# Patient Record
Sex: FEMALE | Race: WHITE | NOT HISPANIC OR LATINO | Employment: UNEMPLOYED | URBAN - METROPOLITAN AREA
[De-identification: names, ages, dates, MRNs, and addresses within clinical notes are randomized per-mention and may not be internally consistent; named-entity substitution may affect disease eponyms.]

---

## 2017-02-20 ENCOUNTER — ALLSCRIPTS OFFICE VISIT (OUTPATIENT)
Dept: OTHER | Facility: OTHER | Age: 42
End: 2017-02-20

## 2017-05-12 ENCOUNTER — ALLSCRIPTS OFFICE VISIT (OUTPATIENT)
Dept: OTHER | Facility: OTHER | Age: 42
End: 2017-05-12

## 2017-06-14 ENCOUNTER — ALLSCRIPTS OFFICE VISIT (OUTPATIENT)
Dept: OTHER | Facility: OTHER | Age: 42
End: 2017-06-14

## 2017-08-16 ENCOUNTER — ALLSCRIPTS OFFICE VISIT (OUTPATIENT)
Dept: OTHER | Facility: OTHER | Age: 42
End: 2017-08-16

## 2017-09-25 ENCOUNTER — GENERIC CONVERSION - ENCOUNTER (OUTPATIENT)
Dept: OTHER | Facility: OTHER | Age: 42
End: 2017-09-25

## 2017-10-19 ENCOUNTER — GENERIC CONVERSION - ENCOUNTER (OUTPATIENT)
Dept: OTHER | Facility: OTHER | Age: 42
End: 2017-10-19

## 2018-01-09 NOTE — RESULT NOTES
Verified Results  * XR CHEST PA & LATERAL [id number] 11:05AM Hollace Derick     Test Name Result Flag Reference   XR CHEST PA & LATERAL (Report)     CHEST      INDICATION: Shortness of breath for 3 weeks  COMPARISON: None     VIEWS: Frontal and lateral projections; 2 images     FINDINGS:        Cardiomediastinal silhouette appears unremarkable  The lungs are clear  No pneumothorax or pleural effusion  Visualized osseous structures appear within normal limits for the patient's age  IMPRESSION:     No active pulmonary disease  Workstation performed: [id number]     Signed by:   Fredy Patel MD   5/27/16     VAS LOWER LIMB VENOUS DUPLEX STUDY, UNILATERAL/LIMITED [id number] 12:00AM Hollace Derick     Test Name Result Flag Reference   VAS LOWER LIMB VENOUS DUPLEX STUDY, UNILATERAL/LIMITED (Report)     [facility] REPORT   CLINICAL:   Indications: Left Localized edema [R60 0]  x 1 day   No pain   Dyspnea x 2 weeks   upper back pain   The patient has history of smoking (current) 1 ppd  She has no history of CAD,   DVT or malignancy  The patient current BMI is 42 14, Weight is 188 lb and Height   is 56 in  FINDINGS:      Segment Right      Left          Impression    Impression       CFV   Normal (Patent) Normal (Patent)             CONCLUSION:   Impression:   RIGHT LOWER LIMB LIMITED: NORMAL   Evaluation shows no evidence of thrombus in the common femoral vein  Doppler evaluation shows a normal response to augmentation maneuvers  LEFT LOWER LIMB: NORMAL   No evidence of acute or chronic deep vein thrombosis   No evidence of superficial thrombophlebitis noted  Doppler evaluation shows a normal response to distal augmentation maneuvers     Popliteal, posterior tibial and anterior tibial arterial Doppler waveforms are   triphasic      SIGNATURE:   Electronically Signed by: Harmony Chappell MD, RPVI on 2016-05-28 12:41:45 PM

## 2018-01-10 NOTE — PROGRESS NOTES
Chief Complaint  Spirometry testing completed patient gave good effort and cooperation  with some coughing and wheezing noted during test please see report in EMR      Active Problems    1  Acute bacterial pharyngitis (462) (J02 8,B96 89)   2  Acute bronchitis (466 0) (J20 9)   3  BMI 28 0-28 9,adult (V85 24) (Z68 28)   4  Breast abscess (611 0) (N61 1)   5  Chronic obstructive pulmonary disease (496) (J44 9)   6  Cluster headache (339 00) (G44 009)   7  Depression (311) (F32 9)   8  Encounter for smoking cessation counseling (V65 42,305 1) (Z71 6,Z72 0)   9  Nipple Discharge (611 79)   10  Ovarian cyst (620 2) (N83 20)   11  Wears glasses (V49 89) (Z97 3)    Current Meds   1  Albuterol Sulfate (2 5 MG/3ML) 0 083% Inhalation Nebulization Solution; USE 1 UNIT   DOSE IN NEBULIZER EVERY 4 HOURS AS NEEDED; Therapy: 80Hgw3751 to (Last Rx:95Ktq7975)  Requested for: 46Orh6071 Ordered   2  Azithromycin 250 MG Oral Tablet; TAKE 2 TABLETS ON DAY 1 THEN TAKE 1 TABLET A   DAY FOR 4 DAYS; Therapy: 74KQE7576 to (Last Rx:28Bnt9863)  Requested for: 04CLO3257 Ordered   3  Flovent  MCG/ACT Inhalation Aerosol; INHALE 1 PUFF TWICE DAILY  clean   mouth with water; swish and spit after each use; Therapy: 72Fjy3123 to (Last Rx:99Rmy4644)  Requested for: 03Kyk8294 Ordered   4  GuaiFENesin  MG TB12; TAKE 1 TABLET EVERY 12 HOURS AS NEEDED FOR   CONGESTION; Therapy: 77Qyz7188 to (Evaluate:67Bjx8399)  Requested for: 36Mjz2069; Last   Rx:16Dlp9418 Ordered   5  Nebulizer Device; use as directed; Therapy: 98Hif1771 to (Last Rx:90Zir6765)  Requested for: 45Lpd6963 Ordered   6  Nebulizer/Adult Mask KIT; use as directed; Therapy: 78Ofr8284 to (Last Rx:79Ful0867)  Requested for: 71Fex4110 Ordered   7  ProAir  (90 Base) MCG/ACT Inhalation Aerosol Solution; INHALE 1-2 PUFFS   EVERY 4-6 HOURS AS NEEDED AND AS DIRECTED; Therapy: 98WEP2642 to (Last Rx:27May2016) Ordered   8   TraZODone HCl - 50 MG Oral Tablet; TAKE 1 TABLET AT BEDTIME AS NEEDED FOR   SLEEP; Therapy: 96GHC5046 to (Evaluate:35Dwe6063)  Requested for: 72SYC6606; Last   Rx:56Pju5599 Ordered    Allergies    1  Bactrim DS TABS   2  Penicillins    3  No Known Environmental Allergies   4  No Known Food Allergies    Plan  Acute bacterial pharyngitis    · Azithromycin 250 MG Oral Tablet  Acute bronchitis    · GuaiFENesin  MG TB12    Future Appointments    Date/Time Provider Specialty Site   07/19/2017 05:30 PM JAGDEEP Macias   Family [facility]     Signatures   Electronically signed by : JAGDEEP Canales ; Arsh 15 2017 12:21PM EST                       (Author)    Electronically signed by : EDITH Winslow ; Jun 16 2017  3:03PM EST                       (Author)

## 2018-01-10 NOTE — PROGRESS NOTES
Assessment    1  Chronic obstructive pulmonary disease (496) (J44 9)   2  Cluster headache (339 00) (G44 009)   3  Depression (311) (F32 9)   4  Encounter for smoking cessation counseling (V65 42,305 1) (Z71 6,Z72 0)   5  BMI 28 0-28 9,adult (V85 24) (Z68 28)   6  Acute bacterial pharyngitis (462) (J02 8,B96 89)   7  History of Tonsillectomy   8  Family history of Bipolar disorder : Daughter    Plan   Acute bacterial pharyngitis    · Azithromycin 250 MG Oral Tablet; TAKE 2 TABLETS ON DAY 1 THEN TAKE 1  TABLET A DAY FOR 4 DAYS  BMI 28 0-28 9,adult    · Begin or continue regular aerobic exercise  Gradually work up to at least {count1}  sessions of {dur1} of exercise a week ; Status:Complete;   Done: 94XQP2374   · Eat a low fat and low cholesterol diet ; Status:Complete;   Done: 84JIE1404   · Some eating tips that can help you lose weight ; Status:Complete;   Done: 31RRH7602  Depression    · TraZODone HCl - 50 MG Oral Tablet; TAKE 1 TABLET AT BEDTIME AS NEEDED  FOR SLEEP    Complete PFT; Status:Hold For - Scheduling; Requested for:06Wsq8143;   Perform:[facility]; PJD:92SUO3112; Ordered;     For:Chronic obstructive pulmonary disease; Ordered By:Taj Da Silva;      Discussion/Summary    Patient was here complaining of chest pain for 2 weeks which is resolved, discussed with the patient that her chest pain is most likely related to her anxiety and depression, patient today scored 15 in PHQ9 , scored positive for mood disorder questionnaire, advised patient to follow-up with family guidance for further evaluation for possible bipolar, we'll start her on trazodone 50 mg daily, discussed with patient that she will not feel the effects of the medication directly to determine need about 4-6 weeks to feel the difference  For COPD: will send the patient for Pulmonary function test Did correct diagnosis, counseled patient about smoking cessation and offered to help, patient stated that she is ready for now but she will try later  # Elevated BMI: discussed with patient the need to lose weight with diet and exercise, educated patient about healthy diet, advised patient to exercise in regular basis and her target should be one hour 5 times a week  # Bacterial pharyngitis: Will start her on azithromycin 250 mg, take 2 tablets the first day then one tablet until finished  Will follow-up in one month  Possible side effects of new medications were reviewed with the patient/guardian today  The treatment plan was reviewed with the patient/guardian  The patient/guardian understands and agrees with the treatment plan     Self Referrals: Yes GYN      Chief Complaint  Chest pain for 2 weeks      History of Present Illness  HPI: 49-year-old female came to the clinic complaining of chest pain for about 2 weeks, her pain was located in the middle of her chest, achy and sharp, lasts about 5 minutes, does not radiate to any location, sometimes associated with shortness of breath, currently has no chest pain, she was not feeling good , she is anxious about her daughter who just moved to [place], her daughter has bipolar for which she takes medications  Patient states that she was told that she has situational depression, sometimes she feels angry, each month she has wanted one week for which she feels more hyper, and spends a lot of money for which she regrets, she has been like that for about 10 years, she always has problems staying asleep, there is no family history of bipolar part from her daughter, today she scored 13 in phq 9, An scored positive in mood disorder questionnaire  She feels that her throat is swollen, she was told that she had a viral infection about 3 weeks ago but still feeling not cured, she she was told that she has COPD but never had lung function tests, she has a 27 year history of smoking one pack a day, still smoking, Not willing to quit for now   She also has migraine headache, her headache is around her eyes, sometimes has tears with her headache, she has headache couple times a month then it will go away and come back in different month, she has no phonophobia or photophobia, no nausea or vomiting, does not know when she will get a headache  She has her own GYN doctor for which she follows for Pap smear and mammogram  She is gaining weight and she does not do diet or exercise  Review of Systems    Constitutional: feeling poorly and feeling tired, but no fever and no chills  Eyes: no eye pain, no eyesight problems, no dryness of the eyes, eyes not red and no itching of the eyes  ENT: sore throat, but no earache, no nosebleeds and no nasal discharge  Cardiovascular: the heart rate was not slow, no chest pain, the heart rate was not fast, no palpitations and no lower extremity edema  Respiratory: shortness of breath, but no cough, no orthopnea, no wheezing and no shortness of breath during exertion  Gastrointestinal: no abdominal pain, no nausea, no vomiting and no diarrhea  Genitourinary: no dysuria  Musculoskeletal: myalgias, but no arthralgias, no joint swelling and no joint stiffness  Integumentary: no rashes, no itching, no breast pain and no breast lump  Neurological: headache, but no numbness, no tingling, no dizziness and no difficulty walking  Psychiatric: anxiety, sleep disturbances, depression and emotional problems, but not suicidal and no personality change  Hematologic/Lymphatic: no swollen glands, no tendency for easy bleeding, no tendency for easy bruising and no swollen glands in the neck  Active Problems    1  Acute bronchitis (466 0) (J20 9)   2  Breast abscess (611 0) (N61 1)   3  Chronic obstructive pulmonary disease (496) (J44 9)   4  Nipple Discharge (611 79)   5  Ovarian cyst (620 2) (N83 20)   6   Wears glasses (V49 89) (Z97 3)    Past Medical History    · History of Abdominal pain, RUQ (789 01) (R10 11)   · History of Acute bronchiolitis (466 19) (J21 9)   · History of Acute upper respiratory infection (465 9) (J06 9)   · History of Back pain (724 5) (M54 9)   · History of Breast abscess (611 0) (N61 1)   · History of Chest pain (786 50) (R07 9)   · History of Cyst of breast, unspecified laterality (610 0) (N60 09)   · History of Dizziness of unknown cause (780 4) (R42)   · History of Dry skin (701 1) (L85 3)   · History of Ear ache (388 70) (H92 09)   · History of Edema leg (782 3) (R60 0)   · History of Elderly multigravida with antepartum condition or complication (844 56)  (J69 946)   · History of Fetal Bradycardia - Before Onset Of Labor (763 81)   · History of allergic drug reaction (V14 9) (Z88 9)   · History of amenorrhea (V13 29) (Z87 42)   · History of chronic obstructive lung disease (V12 69) (Z87 09)   · History of esophageal reflux (V12 79) (Z87 19)   · History of hyperthyroidism (V12 29) (Z86 39)   · History of migraine (V12 49) (Z86 69)   · History of shortness of breath (V13 89) (M28 023)   · History of Influenza vaccination declined (V64 06) (Z28 21)   · History of Joint pain (719 40) (M25 50)   · History of Migraine without aura, intractable (346 11) (G43 019)   · History of Muscle pain (729 1) (M79 1)   · History of Nonpuerperal abscess of breast (611 0) (N61 1)   · History of Normal pregnancy (V22 2) (Z34 90)   · History of Poor Prenatal Care (V23 7)   · History of Pregnant With History Of Grand Multiparity (V23 3)   · History of Rash (782 1) (R21)   · History of Subclinical hyperthyroidism (242 90) (E05 90)   · History of Swelling of ankle (719 07) (M25 473)   · History of Tobacco use disorder complicating pregnancy, childbirth, or the puerperium,  antepartum condition or complication (623 14) (C16 541)   · History of Uterine scar from previous  delivery, antepartum condition or  complication (403 89) (M49 47)    Surgical History    · History of Breast Surgery   · History of  Section   · History of Tonsillectomy    Family History  Mother · Family history of Chronic Obstructive Pulmonary Disease With Exacerbation   · Family history of Emphysema   · Family history of Pulmonary Disease   · Family history of Reported Family History Of Heart Disease  Father    · Family history of Aneurysm Of The Cerebellar Artery   · Family history of   Daughter    · Family history of Bipolar disorder  Sibling    · Family history of   Sister    · Family history of Thyroid condition   · Family history of Trisomy 24 (Down Syndrome)  Maternal Grandfather    · Family history of Diabetes Mellitus (V18 0)  Maternal Aunt    · Family history of Breast cancer    Social History    · Current Every Day Smoker (305 1)   · Current Smoker (305 1)   · Does not drink alcohol (V49 89) (Z78 9)   · Denied: History of Drug use   ·  (V61 03) (Z63 5)    Current Meds   1  Albuterol Sulfate (2 5 MG/3ML) 0 083% Inhalation Nebulization Solution; USE 1 UNIT   DOSE IN NEBULIZER EVERY 4 HOURS AS NEEDED; Therapy: 20Ucr4792 to (Last Rx:08Xmv2616)  Requested for: 97Gxz1282 Ordered   2  Flovent  MCG/ACT Inhalation Aerosol; INHALE 1 PUFF TWICE DAILY  clean   mouth with water; swish and spit after each use; Therapy: 88Spb9396 to (Last Rx:64Ysl8953)  Requested for: 60Eap6542 Ordered   3  GuaiFENesin  MG TB12; TAKE 1 TABLET EVERY 12 HOURS AS NEEDED FOR   CONGESTION; Therapy: 95Opc4696 to (Evaluate:16Moh1710)  Requested for: 33Ddd8740; Last   Rx:35Itf0908 Ordered   4  Nebulizer Device; use as directed; Therapy: 96Cxt7165 to (Last Rx:80Tqw1421)  Requested for: 46Ecq3161 Ordered   5  Nebulizer/Adult Mask KIT; use as directed; Therapy: 71Qyh3999 to (Last Rx:50Myb6993)  Requested for: 32Dwu4006 Ordered   6  ProAir  (90 Base) MCG/ACT Inhalation Aerosol Solution; INHALE 1-2 PUFFS   EVERY 4-6 HOURS AS NEEDED AND AS DIRECTED; Therapy: 23YQV7700 to (Last Rx:97Ibj9516) Ordered    Allergies    1  Bactrim DS TABS   2  Penicillins    3   No Known Environmental Allergies   4  No Known Food Allergies    Vitals   Recorded: 46CSN3578 11:09AM   Heart Rate 95   Respiration 18   Systolic 902   Diastolic 70   Height 5 ft 6 5 in   Weight 181 lb 2 oz   BMI Calculated 28 8   BSA Calculated 1 93   O2 Saturation 98     Physical Exam    Constitutional   General appearance: No acute distress, well appearing and well nourished  Eyes   Conjunctiva and lids: No swelling, erythema or discharge  Pupils and irises: Equal, round and reactive to light  Ears, Nose, Mouth, and Throat   External inspection of ears and nose: Normal     Otoscopic examination: Tympanic membranes translucent with normal light reflex  Canals patent without erythema  Oropharynx: Normal with no erythema, edema, exudate or lesions  Pulmonary   Respiratory effort: No increased work of breathing or signs of respiratory distress  Auscultation of lungs: Clear to auscultation  Cardiovascular   Palpation of heart: Normal PMI, no thrills  Auscultation of heart: Normal rate and rhythm, normal S1 and S2, without murmurs  Examination of extremities for edema and/or varicosities: Normal     Abdomen   Abdomen: Non-tender, no masses  Liver and spleen: No hepatomegaly or splenomegaly  Lymphatic   Palpation of lymph nodes in neck: No lymphadenopathy  Musculoskeletal   Gait and station: Normal     Digits and nails: Normal without clubbing or cyanosis  Inspection/palpation of joints, bones, and muscles: Normal     Skin   Skin and subcutaneous tissue: Normal without rashes or lesions  Neurologic   Cranial nerves: Cranial nerves 2-12 intact  Reflexes: 2+ and symmetric  Sensation: No sensory loss      Psychiatric   Orientation to person, place, and time: Normal     Mood and affect: Normal        Results/Data  PHQ-9 Adult Depression Screening [id number] 12:27PM Dunia Ferrara     Test Name Result Flag Reference   PHQ-9 Adult Depression Score 15     Over the last two weeks, how often have you been bothered by any of the following problems? Little interest or pleasure in doing things: Nearly every day - 3  Feeling down, depressed, or hopeless: More than half the days - 2  Trouble falling or staying asleep, or sleeping too much: Nearly every day - 3  Feeling tired or having little energy: Nearly every day - 3  Poor appetite or over eating: Nearly every day - 3  Feeling bad about yourself - or that you are a failure or have let yourself or your family down: Several days - 1  Trouble concentrating on things, such as reading the newspaper or watching television: Not at all - 0  Moving or speaking so slowly that other people could have noticed  Or the opposite -  being so fidgety or restless that you have been moving around a lot more than usual: Not at all - 0  Thoughts that you would be better off dead, or of hurting yourself in some way: Not at all - 0   PHQ-9 Adult Depression Screening Positive     PHQ-9 Difficulty Level Somewhat difficult     PHQ-9 Severity      Moderately Severe Depression       Attending Note  Attending Note: Attending Note: I interviewed and examined the patient, I supervised the Resident and I agree with the Resident management plan as it was presented to me  Level of Participation: I was present in clinic and examined the patient  Comments/Additional Findings: Trazodone given for sleep  I agree with the Resident's note except Pharynx exam: whitish exudates posterior pharynx  Rx azithromycin  Amoxicillin avoided as mononucleosis also a possibility  Signatures   Electronically signed by : JAGDEEP Hickman ; May 15 2017  4:39AM EST                       (Author)    Electronically signed by :  JAGDEEP Carreno ; May 15 2017  9:57AM EST                       (Co-author)

## 2018-01-13 VITALS
OXYGEN SATURATION: 96 % | WEIGHT: 181 LBS | HEART RATE: 106 BPM | DIASTOLIC BLOOD PRESSURE: 86 MMHG | SYSTOLIC BLOOD PRESSURE: 132 MMHG | TEMPERATURE: 96.7 F | BODY MASS INDEX: 28.41 KG/M2 | HEIGHT: 67 IN | RESPIRATION RATE: 16 BRPM

## 2018-01-14 VITALS
HEIGHT: 67 IN | SYSTOLIC BLOOD PRESSURE: 140 MMHG | OXYGEN SATURATION: 97 % | WEIGHT: 173 LBS | RESPIRATION RATE: 18 BRPM | BODY MASS INDEX: 27.15 KG/M2 | DIASTOLIC BLOOD PRESSURE: 90 MMHG | TEMPERATURE: 96.4 F | HEART RATE: 92 BPM

## 2018-01-14 VITALS
DIASTOLIC BLOOD PRESSURE: 70 MMHG | BODY MASS INDEX: 28.43 KG/M2 | HEART RATE: 95 BPM | WEIGHT: 181.13 LBS | HEIGHT: 67 IN | OXYGEN SATURATION: 98 % | RESPIRATION RATE: 18 BRPM | SYSTOLIC BLOOD PRESSURE: 130 MMHG

## 2018-01-22 VITALS
HEART RATE: 109 BPM | RESPIRATION RATE: 18 BRPM | HEIGHT: 67 IN | SYSTOLIC BLOOD PRESSURE: 132 MMHG | TEMPERATURE: 98.3 F | OXYGEN SATURATION: 96 % | WEIGHT: 166 LBS | BODY MASS INDEX: 26.06 KG/M2 | DIASTOLIC BLOOD PRESSURE: 78 MMHG

## 2018-06-01 ENCOUNTER — OFFICE VISIT (OUTPATIENT)
Dept: FAMILY MEDICINE CLINIC | Facility: CLINIC | Age: 43
End: 2018-06-01
Payer: COMMERCIAL

## 2018-06-01 VITALS
OXYGEN SATURATION: 98 % | DIASTOLIC BLOOD PRESSURE: 82 MMHG | BODY MASS INDEX: 25.11 KG/M2 | WEIGHT: 160 LBS | RESPIRATION RATE: 16 BRPM | HEIGHT: 67 IN | SYSTOLIC BLOOD PRESSURE: 148 MMHG | HEART RATE: 102 BPM

## 2018-06-01 DIAGNOSIS — R07.89 LEFT-SIDED CHEST WALL PAIN: Primary | ICD-10-CM

## 2018-06-01 DIAGNOSIS — J44.9 CHRONIC OBSTRUCTIVE PULMONARY DISEASE, UNSPECIFIED COPD TYPE (HCC): ICD-10-CM

## 2018-06-01 DIAGNOSIS — M62.838 MUSCLE SPASM: ICD-10-CM

## 2018-06-01 PROCEDURE — 99213 OFFICE O/P EST LOW 20 MIN: CPT | Performed by: FAMILY MEDICINE

## 2018-06-01 RX ORDER — TRAZODONE HYDROCHLORIDE 100 MG/1
1 TABLET ORAL
COMMUNITY
End: 2018-09-25

## 2018-06-01 RX ORDER — METRONIDAZOLE 500 MG/1
500 TABLET ORAL EVERY 8 HOURS
Refills: 0 | COMMUNITY
Start: 2018-05-21 | End: 2018-06-12

## 2018-06-01 RX ORDER — ALBUTEROL SULFATE 90 UG/1
2 AEROSOL, METERED RESPIRATORY (INHALATION) EVERY 6 HOURS PRN
COMMUNITY
End: 2018-09-25 | Stop reason: SDUPTHER

## 2018-06-01 RX ORDER — CIPROFLOXACIN 500 MG/1
500 TABLET, FILM COATED ORAL EVERY 12 HOURS
Refills: 0 | COMMUNITY
Start: 2018-05-21 | End: 2018-06-12

## 2018-06-01 RX ORDER — VENLAFAXINE HYDROCHLORIDE 150 MG/1
CAPSULE, EXTENDED RELEASE ORAL
Refills: 0 | COMMUNITY
Start: 2018-05-13 | End: 2018-09-25

## 2018-06-01 RX ORDER — CYCLOBENZAPRINE HCL 10 MG
10 TABLET ORAL 2 TIMES DAILY PRN
Qty: 20 TABLET | Refills: 0 | Status: SHIPPED | OUTPATIENT
Start: 2018-06-01 | End: 2018-06-12

## 2018-06-01 RX ORDER — VENLAFAXINE HYDROCHLORIDE 75 MG/1
CAPSULE, EXTENDED RELEASE ORAL
Refills: 0 | COMMUNITY
Start: 2018-05-01 | End: 2018-09-25

## 2018-06-01 RX ORDER — ALBUTEROL SULFATE 2.5 MG/3ML
1 SOLUTION RESPIRATORY (INHALATION) EVERY 4 HOURS PRN
COMMUNITY
Start: 2017-02-20 | End: 2018-09-25 | Stop reason: SDUPTHER

## 2018-06-01 NOTE — PROGRESS NOTES
Assessment/Plan:       Diagnoses and all orders for this visit:    Left-sided chest wall pain  -     CT chest w contrast; Future  -     Basic metabolic panel; Future  -     cyclobenzaprine (FLEXERIL) 10 mg tablet; Take 1 tablet (10 mg total) by mouth 2 (two) times a day as needed for muscle spasms  -     Basic metabolic panel  -     If CT chest is Normal then plan to do physical therapy  Chronic obstructive pulmonary disease, unspecified COPD type ([facility] [address] )  -     CT chest w contrast; Future    Muscle spasm  -     cyclobenzaprine (FLEXERIL) 10 mg tablet; Take 1 tablet (10 mg total) by mouth 2 (two) times a day as needed for muscle spasms      Subjective:      Patient ID: Todd Purdy is a 43 y o  female  Patient is here with the complain of sharp pain in left midaxillary line  As per patient she had a fistula removed from her left breast on February, 2018 and she started to have left midaxillary line pain after the surgery  Patient thought it will go away that is why she did not seek for help  As per patient the pain is mostly when she moves her left arm,  No pain at rest  Patient is also a current everyday smoker, has COPD  The following portions of the patient's history were reviewed and updated as appropriate: allergies, current medications, past family history, past medical history, past social history, past surgical history and problem list     Review of Systems   Constitutional: Negative for chills and fever  HENT: Negative for congestion  Respiratory: Positive for cough  Negative for shortness of breath  Cardiovascular: Negative for chest pain and leg swelling     Musculoskeletal:         Left-sided  Midaxillary line chest wall pain         Objective:      /82 (BP Location: Left arm, Patient Position: Sitting)   Pulse 102   Resp 16   Ht 5' 7" (1 702 m)   Wt 72 6 kg (160 lb)   SpO2 98%   BMI 25 06 kg/m²          Physical Exam   Constitutional: She appears well-developed and well-nourished  HENT:   Head: Normocephalic and atraumatic  Eyes: Conjunctivae are normal  Pupils are equal, round, and reactive to light  Neck: Normal range of motion  Cardiovascular: Normal rate, regular rhythm and normal heart sounds  Pulmonary/Chest: Effort normal  No respiratory distress  Musculoskeletal:    No tenderness on palpation in  Left midaxillary line  Full range of motion of left shoulder joint  Lymphadenopathy:     She has no cervical adenopathy

## 2018-06-06 LAB
BUN SERPL-MCNC: 5 MG/DL (ref 6–24)
BUN/CREAT SERPL: 10 (ref 9–23)
CALCIUM SERPL-MCNC: 8.9 MG/DL (ref 8.7–10.2)
CHLORIDE SERPL-SCNC: 96 MMOL/L (ref 96–106)
CO2 SERPL-SCNC: 22 MMOL/L (ref 18–29)
CREAT SERPL-MCNC: 0.5 MG/DL (ref 0.57–1)
GLUCOSE SERPL-MCNC: 81 MG/DL (ref 65–99)
POTASSIUM SERPL-SCNC: 4 MMOL/L (ref 3.5–5.2)
SL AMB EGFR AFRICAN AMERICAN: 138 ML/MIN/1.73
SL AMB EGFR NON AFRICAN AMERICAN: 120 ML/MIN/1.73
SODIUM SERPL-SCNC: 135 MMOL/L (ref 134–144)

## 2018-06-08 ENCOUNTER — TELEPHONE (OUTPATIENT)
Dept: FAMILY MEDICINE CLINIC | Facility: CLINIC | Age: 43
End: 2018-06-08

## 2018-06-08 ENCOUNTER — HOSPITAL ENCOUNTER (OUTPATIENT)
Dept: RADIOLOGY | Facility: HOSPITAL | Age: 43
Discharge: HOME | End: 2018-06-08
Payer: COMMERCIAL

## 2018-06-08 DIAGNOSIS — R07.89 LEFT-SIDED CHEST WALL PAIN: ICD-10-CM

## 2018-06-08 DIAGNOSIS — J44.9 CHRONIC OBSTRUCTIVE PULMONARY DISEASE, UNSPECIFIED COPD TYPE (HCC): ICD-10-CM

## 2018-06-08 PROCEDURE — 71260 CT THORAX DX C+: CPT

## 2018-06-08 RX ADMIN — IOHEXOL 85 ML: 350 INJECTION, SOLUTION INTRAVENOUS at 13:59

## 2018-06-08 NOTE — TELEPHONE ENCOUNTER
Pt Called in and stated that she was in pain and would like a call back as soon as possible  PT did have CS done today and waiting for results

## 2018-06-11 ENCOUNTER — TELEPHONE (OUTPATIENT)
Dept: FAMILY MEDICINE CLINIC | Facility: CLINIC | Age: 43
End: 2018-06-11

## 2018-06-11 DIAGNOSIS — S22.32XA CLOSED FRACTURE OF ONE RIB OF LEFT SIDE, INITIAL ENCOUNTER: Primary | ICD-10-CM

## 2018-06-11 PROBLEM — S22.32XK CLOSED FRACTURE OF ONE RIB OF LEFT SIDE WITH NONUNION: Status: ACTIVE | Noted: 2018-06-11

## 2018-06-11 RX ORDER — METHOCARBAMOL 500 MG/1
500 TABLET, FILM COATED ORAL 3 TIMES DAILY
Qty: 30 TABLET | Refills: 0 | Status: SHIPPED | OUTPATIENT
Start: 2018-06-11 | End: 2018-09-25

## 2018-06-11 RX ORDER — NAPROXEN 500 MG/1
500 TABLET ORAL
Qty: 30 TABLET | Refills: 0 | Status: SHIPPED | OUTPATIENT
Start: 2018-06-11 | End: 2018-06-12 | Stop reason: SDUPTHER

## 2018-06-11 NOTE — TELEPHONE ENCOUNTER
Pt went to get medication, and it is [facility], she wanted to know if she could have something else

## 2018-06-11 NOTE — TELEPHONE ENCOUNTER
I discussed CT chest report with patient which showed  a fracture of the 6th posterior left rib which is displaced anteriorly approximately 2 mm  Recommended to stop taking Flexeril  I also gave referral to be seen by orthopedic and prescribed naproxen with food and Robaxin for her rib pain  Discussed with Dr Marya Vallejo

## 2018-06-12 ENCOUNTER — OFFICE VISIT (OUTPATIENT)
Dept: OBGYN CLINIC | Facility: CLINIC | Age: 43
End: 2018-06-12
Payer: COMMERCIAL

## 2018-06-12 ENCOUNTER — TELEPHONE (OUTPATIENT)
Dept: FAMILY MEDICINE CLINIC | Facility: CLINIC | Age: 43
End: 2018-06-12

## 2018-06-12 VITALS
HEART RATE: 90 BPM | SYSTOLIC BLOOD PRESSURE: 171 MMHG | DIASTOLIC BLOOD PRESSURE: 108 MMHG | WEIGHT: 164 LBS | HEIGHT: 67 IN | BODY MASS INDEX: 25.74 KG/M2

## 2018-06-12 DIAGNOSIS — S22.32XA CLOSED FRACTURE OF ONE RIB OF LEFT SIDE, INITIAL ENCOUNTER: ICD-10-CM

## 2018-06-12 PROCEDURE — 99243 OFF/OP CNSLTJ NEW/EST LOW 30: CPT | Performed by: ORTHOPAEDIC SURGERY

## 2018-06-12 RX ORDER — NAPROXEN 500 MG/1
500 TABLET ORAL 2 TIMES DAILY WITH MEALS
Qty: 60 TABLET | Refills: 0 | Status: SHIPPED | OUTPATIENT
Start: 2018-06-12 | End: 2018-09-25

## 2018-06-12 RX ORDER — CYCLOBENZAPRINE HCL 5 MG
10 TABLET ORAL 3 TIMES DAILY PRN
Qty: 30 TABLET | Refills: 0 | Status: CANCELLED | OUTPATIENT
Start: 2018-06-12

## 2018-06-12 NOTE — PROGRESS NOTES
Assessment/Plan:  1  Closed fracture of one rib of left side, initial encounter  Ambulatory referral to Orthopedic Surgery       Scribe Attestation    I,:   Malachi Blackwell am acting as a scribe while in the presence of the attending physician :        I,:   Noel Preciado, DO personally performed the services described in this documentation    as scribed in my presence :            Neftaly Purdy has a minimally displaced rib fracture visible on C T  The fracture does appear to be acute/subacute  It is unclear exactly when this happened  If this occurred back in February and should have decreased in pain at this point  It does appear to have caused increased discomfort last 2 weeks  If this is an acute fracture, this should likely heal in 4-6 weeks and be significantly reduced and pain in that time  She should continue with pain control with naproxen and possibly topical anesthetic if needed  I advised against narcotic pain medication due to its high potential for addiction  She will hopefully be feeling better in the next few weeks  She may follow up with me as needed  Subjective:   Mindy Grant is a 43 y o  female who presents to the office today for left thoracic pain  She started to experience pain back in February after going through a procedure, withith the last 2 weeks with no known injury the pain has increased serverely  She recently saw her primary care physician who sent her for a CT scan of the chest  This demonstrated a acute/subacute 6th posterior rib fracture with minimal amount of displacement  She was prescribed anti-inflammatory medications which she only has a short prescription for  They also tried a muscle relaxer which did not help her  She describes pain as a sharp stabbing sensation in the posterior rib and thoracic region  It worsens with breathing deeply, coughing or rolling over in bed  It does improve with medication slightly    She cannot think of any other cause or traumatic injury in the last 2 weeks or prior other than being moved during her procedure in February  Review of Systems   Constitutional: Negative for chills, fever and unexpected weight change  HENT: Negative for hearing loss, nosebleeds and sore throat  Eyes: Negative for pain, redness and visual disturbance  Respiratory: Negative for cough, shortness of breath and wheezing  Cardiovascular: Negative for chest pain, palpitations and leg swelling  Gastrointestinal: Negative for abdominal pain, nausea and vomiting  Endocrine: Negative for polydipsia and polyuria  Genitourinary: Negative for dysuria and hematuria  Musculoskeletal:        See HPI   Skin: Negative for rash and wound  Neurological: Negative for dizziness, numbness and headaches  Psychiatric/Behavioral: Negative for decreased concentration and suicidal ideas  The patient is not nervous/anxious  Past Medical History:   Diagnosis Date    Breast abscess     Chronic obstructive lung disease ([facility] [address] )     Cystic breast, unspecified laterality     Elderly multigravida with antepartum condition or complication     last assessed: 2014    Esophageal reflux     last assessed: 2016    Hypothyroidism     last assessed: 2014    Migraine     without aura, intractable  - last assessed: 2014    Nonpuerperal abscess of breast     last assessed:  Oct 16, 2014    Subclinical hyperthyroidism     last assessed: 2014       Past Surgical History:   Procedure Laterality Date    BREAST SURGERY  2014    Managed by: Lane Bernard  SECTION      TONSILLECTOMY      last assessed: May 15, 2017       Family History   Problem Relation Age of Onset    COPD Mother     Emphysema Mother     Pulmonary embolism Mother     Heart disease Mother     Aneurysm Father      of the cerebellar artery     Thyroid disease unspecified Sister     Down syndrome Sister      Trisomy 24    Diabetes Maternal Grandfather      mellitus     Bipolar disorder Daughter     Breast cancer Maternal Aunt        Social History     Occupational History    Not on file  Social History Main Topics    Smoking status: Current Every Day Smoker    Smokeless tobacco: Never Used    Alcohol use No    Drug use: Unknown    Sexual activity: Not on file         Current Outpatient Prescriptions:     albuterol (2 5 mg/3 mL) 0 083 % nebulizer solution, Inhale 1 each every 4 (four) hours as needed, Disp: , Rfl:     albuterol (PROVENTIL HFA,VENTOLIN HFA) 90 mcg/act inhaler, Inhale 2 puffs every 6 (six) hours as needed for wheezing, Disp: , Rfl:     methocarbamol (ROBAXIN) 500 mg tablet, Take 1 tablet (500 mg total) by mouth 3 (three) times a day, Disp: 30 tablet, Rfl: 0    naproxen (NAPROSYN) 500 mg tablet, Take 1 tablet (500 mg total) by mouth 3 (three) times a day with meals, Disp: 30 tablet, Rfl: 0    traZODone (DESYREL) 100 mg tablet, Take 1 tablet by mouth, Disp: , Rfl:     venlafaxine (EFFEXOR-XR) 150 mg 24 hr capsule, , Disp: , Rfl: 0    venlafaxine (EFFEXOR-XR) 75 mg 24 hr capsule, , Disp: , Rfl: 0    Allergies   Allergen Reactions    Penicillins     Sulfamethoxazole-Trimethoprim        Objective:  Vitals:    06/12/18 1543   BP: (!) 171/108   Pulse: 90       Ortho Exam    Physical Exam   Constitutional: She is oriented to person, place, and time  She appears well-developed and well-nourished  HENT:   Head: Normocephalic and atraumatic  Eyes: Conjunctivae are normal    Neck: Neck supple  Cardiovascular: Intact distal pulses  Pulmonary/Chest: Effort normal    Musculoskeletal:        Thoracic back: She exhibits tenderness, bony tenderness and pain  Back:    Neurological: She is alert and oriented to person, place, and time  Skin: Skin is warm and dry  Psychiatric: She has a normal mood and affect  Her behavior is normal    Vitals reviewed        I have personally reviewed pertinent films in PACS and my interpretation is as follows:  CT scan of the chest demonstrates a minimally displaced fracture in the posterior aspect of the left rib

## 2018-06-12 NOTE — TELEPHONE ENCOUNTER
Pt called in and states that she waited at the pharmacy for a different medication to be called in and nothing was called in  Pt is requesting a similar medication to (Robaxin 500mg) that med is on back order at both [email or web address] and [facility]

## 2018-06-12 NOTE — TELEPHONE ENCOUNTER
Patient came to office appx  7:15 pm and stated she did not want Flexeril it didn't work  I was rooming a patient Niyah Mcneil asked her to wait for nurse so she sat down  Dr Otero explained that Robaxin and Flexeril is all that [employer] will cover so I went to go explain to patient 10 minutes later but she had walked out and was not in the parking lot  Call to her home person who answered stated she did not get home yet so I asked her to call back  In the meantime Dr Claude Swann spoke with Dr Dena Harp who called in [address] [place] [address] again to hold patient until Robaxin is in stock  7:55pm patient called back asked if we could get MD to order something else and she would find out cost and pay out of [facility] ( Dr Claude Swann thinks that's to expensive )  or maybe we could get pre-auth as she failed Flexeril and Robaxin is not available  Dr Sugar Redd also mentioned Tramadol maybe as this is for a rib FX  Patient was upset that no one got back to her yesterday   I apologized and promised someone would find out and call her by tomorrow afternoon

## 2018-09-18 ENCOUNTER — TELEPHONE (OUTPATIENT)
Dept: FAMILY MEDICINE CLINIC | Facility: CLINIC | Age: 43
End: 2018-09-18

## 2018-09-18 NOTE — TELEPHONE ENCOUNTER
Pt has pain when breathing, has broken rib, insurance nurse told her to call us  She has been to ortho already

## 2018-09-18 NOTE — TELEPHONE ENCOUNTER
Patient states she was feeling much better after rib FX in June but had a bad coughing fit and the pain is back  She states not as bad as before but still she is having a hard time taking a deep breath    ER / Evan Flores please advise

## 2018-09-18 NOTE — TELEPHONE ENCOUNTER
Spoke with Preceptor Dr Hiral Ireland and advised to have patient make appointment tomorrow or Thursday for eval  No need for xrays at this time

## 2018-09-20 ENCOUNTER — OFFICE VISIT (OUTPATIENT)
Dept: FAMILY MEDICINE CLINIC | Facility: CLINIC | Age: 43
End: 2018-09-20
Payer: COMMERCIAL

## 2018-09-20 ENCOUNTER — HOSPITAL ENCOUNTER (OUTPATIENT)
Dept: RADIOLOGY | Facility: HOSPITAL | Age: 43
Discharge: HOME | End: 2018-09-20
Payer: COMMERCIAL

## 2018-09-20 ENCOUNTER — TRANSCRIBE ORDERS (OUTPATIENT)
Dept: ADMINISTRATIVE | Facility: HOSPITAL | Age: 43
End: 2018-09-20

## 2018-09-20 VITALS
OXYGEN SATURATION: 98 % | SYSTOLIC BLOOD PRESSURE: 140 MMHG | HEART RATE: 96 BPM | TEMPERATURE: 97.8 F | BODY MASS INDEX: 24.26 KG/M2 | DIASTOLIC BLOOD PRESSURE: 82 MMHG | WEIGHT: 154.9 LBS

## 2018-09-20 DIAGNOSIS — Z72.0 DECLINED SMOKING CESSATION: ICD-10-CM

## 2018-09-20 DIAGNOSIS — R07.81 PLEURITIC CHEST PAIN: ICD-10-CM

## 2018-09-20 DIAGNOSIS — R07.81 PLEURITIC CHEST PAIN: Primary | ICD-10-CM

## 2018-09-20 PROBLEM — F31.81 BIPOLAR 2 DISORDER (HCC): Status: ACTIVE | Noted: 2017-08-16

## 2018-09-20 PROBLEM — F32.A DEPRESSION: Status: ACTIVE | Noted: 2017-05-12

## 2018-09-20 PROBLEM — G47.00 INSOMNIA: Status: ACTIVE | Noted: 2017-08-16

## 2018-09-20 PROCEDURE — 3725F SCREEN DEPRESSION PERFORMED: CPT | Performed by: FAMILY MEDICINE

## 2018-09-20 PROCEDURE — 71101 X-RAY EXAM UNILAT RIBS/CHEST: CPT

## 2018-09-20 PROCEDURE — 99213 OFFICE O/P EST LOW 20 MIN: CPT | Performed by: FAMILY MEDICINE

## 2018-09-20 NOTE — PROGRESS NOTES
[address] [facility] 37 y o  female  MRN [id number]    Assessment/Plan    Diagnoses and all orders for this visit:    Pleuritic chest pain  Possible re-injury of left healing rib fracture  Patient has breath sounds in all lung fields anterior laterally and posteriorly  Mild tenderness to palpation over anterior lower ribs on left side  Will order x-ray of the ribs for evaluation of trauma and surveillance of previous fracture  Advised patient supportive care, can use compression wrap  Advised ibuprofen 600 mg q  6-8 hours p r n     Can use Tylenol 650 mg q 6 hours p r n  for pain  Return precautions given to patient  If patient experiences any hemoptysis, shortness of breath, dyspnea, tachypnea she is to call clinic or present to the ED for further evaluation  -     XR ribs left w pa chest min 3 views; Future    Patient declines smoking cessation counseling  Patient declines influenza vaccine  Patient to return to clinic p r n  for any worsening of condition  Catalina Lundborg, MD    Subjective     HPI  Marilyn Ramírez 37 y o  female, PMHx left 6th rib posterior fracture displaced anterior approximately 2 mm  noted on CT from June 2018, presents to clinic for evaluation of renewed chest discomfort with deep breath and coughing  Patient states that over that past 2-3 weeks she has experienced left lower chest discomfort with deep breath and coughing  Pain is a short 10/10 sharp when coughing, sneezing, or taking a deep breath  Pain resolves after these events  Patient states that this was initiated by a coughing fit and then after that she had the pain with deep breathing  Patient denies any hemoptysis, recent weight loss or weight gain, shortness of breath or dyspnea      Past Medical History:   Diagnosis Date    Breast abscess     Chronic obstructive lung disease ([facility] [address] )     Cystic breast, unspecified laterality     Elderly multigravida with antepartum condition or complication     last assessed: 2014    Esophageal reflux     last assessed: 2016    Hypothyroidism     last assessed: 2014    Migraine     without aura, intractable  - last assessed: 2014    Nonpuerperal abscess of breast     last assessed:  Oct 16, 2014    Subclinical hyperthyroidism     last assessed: 2014       Past Surgical History:   Procedure Laterality Date    BREAST SURGERY  2014    Managed by: Shirley Garcia  SECTION      TONSILLECTOMY      last assessed: May 15, 2017       Family History   Problem Relation Age of Onset    COPD Mother     Emphysema Mother     Pulmonary embolism Mother     Heart disease Mother     Aneurysm Father         of the cerebellar artery     Thyroid disease unspecified Sister     Down syndrome Sister         Trisomy 24    Diabetes Maternal Grandfather         mellitus     Bipolar disorder Daughter     Breast cancer Maternal Aunt        History   Alcohol Use No       History   Drug use: Unknown       History   Smoking Status    Current Every Day Smoker   Smokeless Tobacco    Never Used       Social History     Allergies   Allergen Reactions    Penicillins     Sulfamethoxazole-Trimethoprim        The following portions of the patient's history were reviewed and updated as appropriate: allergies, current medications, past family history, past medical history, past social history, past surgical history and problem list       Current Outpatient Prescriptions:     albuterol (2 5 mg/3 mL) 0 083 % nebulizer solution, Inhale 1 each every 4 (four) hours as needed, Disp: , Rfl:     albuterol (PROVENTIL HFA,VENTOLIN HFA) 90 mcg/act inhaler, Inhale 2 puffs every 6 (six) hours as needed for wheezing, Disp: , Rfl:     methocarbamol (ROBAXIN) 500 mg tablet, Take 1 tablet (500 mg total) by mouth 3 (three) times a day (Patient not taking: Reported on 2018 ), Disp: 30 tablet, Rfl: 0    naproxen (NAPROSYN) 500 mg tablet, Take 1 tablet (500 mg total) by mouth 2 (two) times a day with meals (Patient not taking: Reported on 9/20/2018 ), Disp: 60 tablet, Rfl: 0    traZODone (DESYREL) 100 mg tablet, Take 1 tablet by mouth, Disp: , Rfl:     venlafaxine (EFFEXOR-XR) 150 mg 24 hr capsule, , Disp: , Rfl: 0    venlafaxine (EFFEXOR-XR) 75 mg 24 hr capsule, , Disp: , Rfl: 0    ROS  Review of Systems   Constitutional: Negative for chills, diaphoresis, fatigue, fever and unexpected weight change  Respiratory: Positive for cough  Negative for chest tightness, shortness of breath, wheezing and stridor  Pain with deep breath   Cardiovascular: Negative for chest pain, palpitations and leg swelling  Gastrointestinal: Negative  Objective    Physical exam    Blood pressure 140/82, pulse 96, temperature 97 8 °F (36 6 °C), temperature source Tympanic, weight 70 3 kg (154 lb 14 4 oz), SpO2 98 %  Physical Exam   Constitutional: She is oriented to person, place, and time  She appears well-developed and well-nourished  No distress  HENT:   Head: Normocephalic and atraumatic  Right Ear: External ear normal    Left Ear: External ear normal    Mouth/Throat: No oropharyngeal exudate  Eyes: Conjunctivae are normal  Pupils are equal, round, and reactive to light  No scleral icterus  Cardiovascular: Normal rate and normal heart sounds  Pulmonary/Chest: Effort normal and breath sounds normal  No respiratory distress  She has no wheezes  She has no rales  She exhibits tenderness (Left lower anterior chest)  Abdominal: Soft  She exhibits no distension  There is no tenderness  Neurological: She is alert and oriented to person, place, and time  Skin: Skin is warm and dry  No rash noted  She is not diaphoretic  No erythema  No pallor  Psychiatric: She has a normal mood and affect   Her behavior is normal

## 2018-09-25 DIAGNOSIS — Z71.6 ENCOUNTER FOR SMOKING CESSATION COUNSELING: ICD-10-CM

## 2018-09-25 DIAGNOSIS — J43.9 PULMONARY EMPHYSEMA, UNSPECIFIED EMPHYSEMA TYPE (HCC): Primary | ICD-10-CM

## 2018-09-25 RX ORDER — ALBUTEROL SULFATE 90 UG/1
2 AEROSOL, METERED RESPIRATORY (INHALATION) EVERY 6 HOURS PRN
Qty: 1 INHALER | Refills: 5 | Status: SHIPPED | OUTPATIENT
Start: 2018-09-25

## 2018-09-25 RX ORDER — ALBUTEROL SULFATE 2.5 MG/3ML
2.5 SOLUTION RESPIRATORY (INHALATION) EVERY 4 HOURS PRN
Qty: 10 ML | Refills: 5 | Status: SHIPPED | OUTPATIENT
Start: 2018-09-25

## 2018-09-25 RX ORDER — BUPROPION HYDROCHLORIDE 150 MG/1
150 TABLET ORAL DAILY
Qty: 30 TABLET | Refills: 5 | Status: SHIPPED | OUTPATIENT
Start: 2018-09-25 | End: 2019-01-24 | Stop reason: SDUPTHER

## 2018-09-27 ENCOUNTER — TELEPHONE (OUTPATIENT)
Dept: FAMILY MEDICINE CLINIC | Facility: CLINIC | Age: 43
End: 2018-09-27

## 2018-09-27 NOTE — TELEPHONE ENCOUNTER
I spoke to the patient about her chest xray and CT scan reports  by 3 months  The first report showed one fractured left posterior rib and the second report showed two fractured ribs  The pt  denied any trauma in between the two separate exams  The pt is a smoker and was encouraged to quit because of the increased cough potential and delayed rib fracture healing  The pt has been diagnosed with COPD based on xray and PFT results  She has been referred to Pulmonary for this problem  The pt  was also advised to f/u in two months if her left rib cage pain persisted for a possible repeat CT scan of the chest to check the status of the fractures

## 2018-09-27 NOTE — TELEPHONE ENCOUNTER
Pt called - stated she spoke to you - but she viewed her CT and her X-ray in My Chart - the CT is showing only 1 rib - her Xray is showing 2 ribs  She is questioning if treatment is same  Please call her back today

## 2018-10-24 ENCOUNTER — OFFICE VISIT (OUTPATIENT)
Dept: PULMONOLOGY | Facility: MEDICAL CENTER | Age: 43
End: 2018-10-24
Payer: COMMERCIAL

## 2018-10-24 VITALS
OXYGEN SATURATION: 99 % | DIASTOLIC BLOOD PRESSURE: 86 MMHG | HEIGHT: 66 IN | WEIGHT: 154 LBS | BODY MASS INDEX: 24.75 KG/M2 | HEART RATE: 72 BPM | TEMPERATURE: 97.7 F | SYSTOLIC BLOOD PRESSURE: 128 MMHG

## 2018-10-24 DIAGNOSIS — F17.210 CIGARETTE NICOTINE DEPENDENCE WITHOUT COMPLICATION: ICD-10-CM

## 2018-10-24 DIAGNOSIS — J43.9 PULMONARY EMPHYSEMA, UNSPECIFIED EMPHYSEMA TYPE (HCC): ICD-10-CM

## 2018-10-24 DIAGNOSIS — R09.82 POST-NASAL DRIP: Primary | ICD-10-CM

## 2018-10-24 PROCEDURE — 94010 BREATHING CAPACITY TEST: CPT | Performed by: INTERNAL MEDICINE

## 2018-10-24 PROCEDURE — 99204 OFFICE O/P NEW MOD 45 MIN: CPT | Performed by: INTERNAL MEDICINE

## 2018-10-24 PROCEDURE — 99406 BEHAV CHNG SMOKING 3-10 MIN: CPT | Performed by: INTERNAL MEDICINE

## 2018-10-24 RX ORDER — AZELASTINE 1 MG/ML
1 SPRAY, METERED NASAL 2 TIMES DAILY
Qty: 30 ML | Refills: 0 | Status: SHIPPED | OUTPATIENT
Start: 2018-10-24

## 2018-10-24 NOTE — LETTER
October 26, 2018     Raffaele Bingham MD  [address]  [place] [address]    Patient: Bridgette Heimlich   YOB: 1975   Date of Visit: 10/24/2018       Dear Dr Debi Scott:    Thank you for referring Giuseppe Mccarthy to me for evaluation  Below are my notes for this consultation  If you have questions, please do not hesitate to call me  I look forward to following your patient along with you  Sincerely,        Ruchi Quan MD        CC: No Recipients  Ruchi Quan MD  10/26/2018 12:46 PM  Sign at close encounter  Assessment/Plan:       Problem List Items Addressed This Visit        Respiratory    Chronic obstructive pulmonary disease ([address] )     Spirometry performed in the office today shows evidence of severe obstructive defect at 44% FEV1 with diminished obstructive ratio  CT chest shows evidence of emphysema as well  Rule out alpha 1 antitrypsin deficiency  Patient will require maintenance inhaler therapy and samples are given to her today  Smoking cessation is discussed in great detail  Relevant Medications    azelastine (ASTELIN) 0 1 % nasal spray    Other Relevant Orders    POCT spirometry (Completed)    Complete pulmonary function test    Alpha 1 Antitrypsin Phenotype    Alpha-1-antitrypsin    CBC and differential       Other    Post-nasal drip - Primary     Astelin nasal spray is ordered  Relevant Medications    azelastine (ASTELIN) 0 1 % nasal spray    Cigarette nicotine dependence without complication     Discussed smoking cessation in detail today  We discussed gum and must images initially  She is unable to tolerate the patch due to rash  She is on Wellbutrin and not responding very well to this  (>3 min spent on this discussion)                 All questions are answered to the patient's satisfaction and understanding  She verbalizes understanding  She is encouraged to call with any further questions or concerns      Portions of the record may have been created with voice recognition software  Occasional wrong word or "sound a like" substitutions may have occurred due to the inherent limitations of voice recognition software  Read the chart carefully and recognize, using context, where substitutions have occurred  a    Electronically Signed by Zuleima Rodgers MD    ______________________________________________________________________    Chief Complaint:   Chief Complaint   Patient presents with    Consult     was dx with Emphysema/COPD per pcp   Cough     sometimes productive (white mucus unless sick)    Wheezing     occurs when sick        Patient ID: Eagle Brown is a 37 y o  y o  female has a past medical history of Breast abscess; Chronic obstructive lung disease ([address] ); Cystic breast, unspecified laterality; Elderly multigravida with antepartum condition or complication; Esophageal reflux; Hypothyroidism; Migraine; Nonpuerperal abscess of breast; and Subclinical hyperthyroidism  10/24/2018  Patient presents today for initial visit  Diagnosed with COPD 6-7 years ago- she would get sick every winter couldn't get rid of it  Always coughs throughout the day  More so in the morning  She brings up whitish mucus, it is very thick    +shortness of breath on extreme exertion- can climb stairs ok- about 3 flights  She gets URI symptoms about once per year  She does not usually take steroids for her breathing difficulty when this happens  She does have Flovent at home but not using it now  She does have a nebulizer at home- has not used it in 6-8 months  Rescue inhaler- maybe once per month- usually in the morning  She does have post nasal drip for about 2 years  She does not use anything for it  Was never diagnosed with seasonal allergies-     No GERD currently  No personal cardiac history  HPI    Occupational/Exposure history: she is not working currently  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    She was in [facility] in the [employer]  She has also worked in doctors office  Pets/Enviroment: she has 2 dogs and a rabbit  Travel history: no recent travel     Review of Systems   All other systems reviewed and are negative  Social history: She reports that she has been smoking  She has a 22 50 pack-year smoking history  She has never used smokeless tobacco  She reports that she does not drink alcohol or use drugs  She smokes less than a ppd per day  Started smoking when she was 12  She has tried to wuit she had tried Chantix() in the past but because of stress  Just started Wellbutrin last month to help her quit smoking        Past surgical history:   Past Surgical History:   Procedure Laterality Date    BREAST SURGERY  2014    Managed by: Sindi Mackenzie  SECTION      TONSILLECTOMY      last assessed: May 15, 2017     Family history:   Family History   Problem Relation Age of Onset    COPD Mother     Emphysema Mother     Pulmonary embolism Mother     Heart disease Mother     Aneurysm Father         of the cerebellar artery     Thyroid disease unspecified Sister     Down syndrome Sister         Trisomy 24    Diabetes Maternal Grandfather         mellitus     Bipolar disorder Daughter     Breast cancer Maternal Aunt        Immunization History   Administered Date(s) Administered    Influenza TIV (IM) 2011    Meningococcal, Unknown Serogroups 2011    Tdap 2013     Current Outpatient Prescriptions   Medication Sig Dispense Refill    albuterol (2 5 mg/3 mL) 0 083 % nebulizer solution Take 1 vial (2 5 mg total) by nebulization every 4 (four) hours as needed for wheezing 10 mL 5    albuterol (PROVENTIL HFA,VENTOLIN HFA) 90 mcg/act inhaler Inhale 2 puffs every 6 (six) hours as needed for wheezing 1 Inhaler 5    buPROPion (WELLBUTRIN XL) 150 mg 24 hr tablet Take 1 tablet (150 mg total) by mouth daily 30 tablet 5    azelastine (ASTELIN) 0 1 % nasal spray 1 spray into each nostril 2 (two) times a day Use in each nostril as directed 30 mL 0     No current facility-administered medications for this visit  Allergies: Penicillins and Sulfamethoxazole-trimethoprim    Objective:  Vitals:    10/24/18 0925   BP: 128/86   BP Location: Right arm   Patient Position: Sitting   Cuff Size: Standard   Pulse: 72   Temp: 97 7 °F (36 5 °C)   TempSrc: Oral   SpO2: 99%   Weight: 69 9 kg (154 lb)   Height: 5' 6" (1 676 m)   Oxygen Therapy  SpO2: 99 %    Wt Readings from Last 3 Encounters:   10/24/18 69 9 kg (154 lb)   09/20/18 70 3 kg (154 lb 14 4 oz)   06/12/18 74 4 kg (164 lb)     Body mass index is 24 86 kg/m²  Physical Exam   Constitutional: She is oriented to person, place, and time  She appears well-developed and well-nourished  No distress  HENT:   Head: Normocephalic and atraumatic  Mouth/Throat: Oropharynx is clear and moist  No oropharyngeal exudate  Eyes: Pupils are equal, round, and reactive to light  EOM are normal    Neck: Normal range of motion  Neck supple  Cardiovascular: Normal rate and regular rhythm  No murmur heard  Pulmonary/Chest: Effort normal and breath sounds normal  No respiratory distress  She has no wheezes  She has no rales  She exhibits no tenderness  Abdominal: Soft  Bowel sounds are normal  She exhibits no distension  There is no tenderness  Musculoskeletal: Normal range of motion  She exhibits no edema  Lymphadenopathy:     She has no cervical adenopathy  Neurological: She is alert and oriented to person, place, and time  No cranial nerve deficit  Skin: Skin is warm and dry  She is not diaphoretic  Psychiatric: She has a normal mood and affect  Her behavior is normal    Vitals reviewed        Lab Review:   Lab Results   Component Value Date     12/03/2016    K 4 0 06/06/2018    CL 96 06/06/2018    CO2 22 06/06/2018    BUN 5 (L) 06/06/2018    CREATININE 0 59 12/03/2016    GLUCOSE 91 12/03/2016    CALCIUM 9 4 12/03/2016     Lab Results   Component Value Date    WBC 8 6 12/03/2016    HGB 14 0 12/03/2016    HCT 41 6 12/03/2016    MCV 95 12/03/2016     12/03/2016       Diagnostics:  I have personally reviewed pertinent reports     and I have personally reviewed pertinent films in PACS

## 2018-10-24 NOTE — PROGRESS NOTES
Assessment/Plan:       Problem List Items Addressed This Visit        Respiratory    Chronic obstructive pulmonary disease ([address] )     Spirometry performed in the office today shows evidence of severe obstructive defect at 44% FEV1 with diminished obstructive ratio  CT chest shows evidence of emphysema as well  Rule out alpha 1 antitrypsin deficiency  Patient will require maintenance inhaler therapy and samples are given to her today  Smoking cessation is discussed in great detail  Relevant Medications    azelastine (ASTELIN) 0 1 % nasal spray    Other Relevant Orders    POCT spirometry (Completed)    Complete pulmonary function test    Alpha 1 Antitrypsin Phenotype    Alpha-1-antitrypsin    CBC and differential       Other    Post-nasal drip - Primary     Astelin nasal spray is ordered  Relevant Medications    azelastine (ASTELIN) 0 1 % nasal spray    Cigarette nicotine dependence without complication     Discussed smoking cessation in detail today  We discussed gum and must images initially  She is unable to tolerate the patch due to rash  She is on Wellbutrin and not responding very well to this  (>3 min spent on this discussion)                 All questions are answered to the patient's satisfaction and understanding  She verbalizes understanding  She is encouraged to call with any further questions or concerns  Portions of the record may have been created with voice recognition software  Occasional wrong word or "sound a like" substitutions may have occurred due to the inherent limitations of voice recognition software  Read the chart carefully and recognize, using context, where substitutions have occurred  a    Electronically Signed by Milka Quinn MD    ______________________________________________________________________    Chief Complaint:   Chief Complaint   Patient presents with    Consult     was dx with Emphysema/COPD per pcp      Cough     sometimes productive (white mucus unless sick)    Wheezing     occurs when sick        Patient ID: Jenifer Awad is a 37 y o  y o  female has a past medical history of Breast abscess; Chronic obstructive lung disease ([address] ); Cystic breast, unspecified laterality; Elderly multigravida with antepartum condition or complication; Esophageal reflux; Hypothyroidism; Migraine; Nonpuerperal abscess of breast; and Subclinical hyperthyroidism  10/24/2018  Patient presents today for initial visit  Diagnosed with COPD 6-7 years ago- she would get sick every winter couldn't get rid of it  Always coughs throughout the day  More so in the morning  She brings up whitish mucus, it is very thick    +shortness of breath on extreme exertion- can climb stairs ok- about 3 flights  She gets URI symptoms about once per year  She does not usually take steroids for her breathing difficulty when this happens  She does have Flovent at home but not using it now  She does have a nebulizer at home- has not used it in 6-8 months  Rescue inhaler- maybe once per month- usually in the morning  She does have post nasal drip for about 2 years  She does not use anything for it  Was never diagnosed with seasonal allergies-     No GERD currently  No personal cardiac history  HPI    Occupational/Exposure history: she is not working currently  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   She was in [facility] in the [employer]  She has also worked in doctors office  Pets/Enviroment: she has 2 dogs and a rabbit  Travel history: no recent travel     Review of Systems   All other systems reviewed and are negative  Social history: She reports that she has been smoking  She has a 22 50 pack-year smoking history  She has never used smokeless tobacco  She reports that she does not drink alcohol or use drugs  She smokes less than a ppd per day  Started smoking when she was 12  She has tried to wuit she had tried Chantix(2015) in the past but because of stress    Just started Wellbutrin last month to help her quit smoking  Past surgical history:   Past Surgical History:   Procedure Laterality Date    BREAST SURGERY  2014    Managed by: Sindi Mackenzie  SECTION      TONSILLECTOMY      last assessed: May 15, 2017     Family history:   Family History   Problem Relation Age of Onset    COPD Mother     Emphysema Mother     Pulmonary embolism Mother     Heart disease Mother     Aneurysm Father         of the cerebellar artery     Thyroid disease unspecified Sister     Down syndrome Sister         Trisomy 24    Diabetes Maternal Grandfather         mellitus     Bipolar disorder Daughter     Breast cancer Maternal Aunt        Immunization History   Administered Date(s) Administered    Influenza TIV (IM) 2011    Meningococcal, Unknown Serogroups 2011    Tdap 2013     Current Outpatient Prescriptions   Medication Sig Dispense Refill    albuterol (2 5 mg/3 mL) 0 083 % nebulizer solution Take 1 vial (2 5 mg total) by nebulization every 4 (four) hours as needed for wheezing 10 mL 5    albuterol (PROVENTIL HFA,VENTOLIN HFA) 90 mcg/act inhaler Inhale 2 puffs every 6 (six) hours as needed for wheezing 1 Inhaler 5    buPROPion (WELLBUTRIN XL) 150 mg 24 hr tablet Take 1 tablet (150 mg total) by mouth daily 30 tablet 5    azelastine (ASTELIN) 0 1 % nasal spray 1 spray into each nostril 2 (two) times a day Use in each nostril as directed 30 mL 0     No current facility-administered medications for this visit  Allergies: Penicillins and Sulfamethoxazole-trimethoprim    Objective:  Vitals:    10/24/18 0925   BP: 128/86   BP Location: Right arm   Patient Position: Sitting   Cuff Size: Standard   Pulse: 72   Temp: 97 7 °F (36 5 °C)   TempSrc: Oral   SpO2: 99%   Weight: 69 9 kg (154 lb)   Height: 5' 6" (1 676 m)   Oxygen Therapy  SpO2: 99 %      Wt Readings from Last 3 Encounters:   10/24/18 69 9 kg (154 lb)   18 70 3 kg (154 lb 14 4 oz) 06/12/18 74 4 kg (164 lb)     Body mass index is 24 86 kg/m²  Physical Exam   Constitutional: She is oriented to person, place, and time  She appears well-developed and well-nourished  No distress  HENT:   Head: Normocephalic and atraumatic  Mouth/Throat: Oropharynx is clear and moist  No oropharyngeal exudate  Eyes: Pupils are equal, round, and reactive to light  EOM are normal    Neck: Normal range of motion  Neck supple  Cardiovascular: Normal rate and regular rhythm  No murmur heard  Pulmonary/Chest: Effort normal and breath sounds normal  No respiratory distress  She has no wheezes  She has no rales  She exhibits no tenderness  Abdominal: Soft  Bowel sounds are normal  She exhibits no distension  There is no tenderness  Musculoskeletal: Normal range of motion  She exhibits no edema  Lymphadenopathy:     She has no cervical adenopathy  Neurological: She is alert and oriented to person, place, and time  No cranial nerve deficit  Skin: Skin is warm and dry  She is not diaphoretic  Psychiatric: She has a normal mood and affect  Her behavior is normal    Vitals reviewed  Lab Review:   Lab Results   Component Value Date     12/03/2016    K 4 0 06/06/2018    CL 96 06/06/2018    CO2 22 06/06/2018    BUN 5 (L) 06/06/2018    CREATININE 0 59 12/03/2016    GLUCOSE 91 12/03/2016    CALCIUM 9 4 12/03/2016     Lab Results   Component Value Date    WBC 8 6 12/03/2016    HGB 14 0 12/03/2016    HCT 41 6 12/03/2016    MCV 95 12/03/2016     12/03/2016       Diagnostics:  I have personally reviewed pertinent reports     and I have personally reviewed pertinent films in PACS

## 2018-10-26 PROBLEM — F17.210 CIGARETTE NICOTINE DEPENDENCE WITHOUT COMPLICATION: Status: ACTIVE | Noted: 2018-10-26

## 2018-10-26 NOTE — ASSESSMENT & PLAN NOTE
Spirometry performed in the office today shows evidence of severe obstructive defect at 44% FEV1 with diminished obstructive ratio  CT chest shows evidence of emphysema as well  Rule out alpha 1 antitrypsin deficiency  Patient will require maintenance inhaler therapy and samples are given to her today  Smoking cessation is discussed in great detail

## 2018-10-26 NOTE — ASSESSMENT & PLAN NOTE
Discussed smoking cessation in detail today  We discussed gum and must images initially  She is unable to tolerate the patch due to rash  She is on Wellbutrin and not responding very well to this    (>3 min spent on this discussion)

## 2018-10-31 LAB
A1AT PHENOTYP SERPL IFE: NORMAL
A1AT SERPL-MCNC: 144 MG/DL (ref 90–200)
BASOPHILS # BLD AUTO: 0.1 X10E3/UL (ref 0–0.2)
BASOPHILS NFR BLD AUTO: 1 %
EOSINOPHIL # BLD AUTO: 0.2 X10E3/UL (ref 0–0.4)
EOSINOPHIL NFR BLD AUTO: 2 %
ERYTHROCYTE [DISTWIDTH] IN BLOOD BY AUTOMATED COUNT: 14.3 % (ref 12.3–15.4)
HCT VFR BLD AUTO: 41 % (ref 34–46.6)
HGB BLD-MCNC: 13.7 G/DL (ref 11.1–15.9)
IMM GRANULOCYTES # BLD: 0 X10E3/UL (ref 0–0.1)
IMM GRANULOCYTES NFR BLD: 0 %
LYMPHOCYTES # BLD AUTO: 2.8 X10E3/UL (ref 0.7–3.1)
LYMPHOCYTES NFR BLD AUTO: 31 %
MCH RBC QN AUTO: 32.4 PG (ref 26.6–33)
MCHC RBC AUTO-ENTMCNC: 33.4 G/DL (ref 31.5–35.7)
MCV RBC AUTO: 97 FL (ref 79–97)
MONOCYTES # BLD AUTO: 0.6 X10E3/UL (ref 0.1–0.9)
MONOCYTES NFR BLD AUTO: 7 %
NEUTROPHILS # BLD AUTO: 5.4 X10E3/UL (ref 1.4–7)
NEUTROPHILS NFR BLD AUTO: 59 %
PLATELET # BLD AUTO: 296 X10E3/UL (ref 150–379)
RBC # BLD AUTO: 4.23 X10E6/UL (ref 3.77–5.28)
WBC # BLD AUTO: 9.1 X10E3/UL (ref 3.4–10.8)

## 2018-11-02 ENCOUNTER — TELEPHONE (OUTPATIENT)
Dept: PULMONOLOGY | Facility: MEDICAL CENTER | Age: 43
End: 2018-11-02

## 2018-11-08 ENCOUNTER — HOSPITAL ENCOUNTER (OUTPATIENT)
Dept: PULMONOLOGY | Facility: HOSPITAL | Age: 43
Discharge: HOME | End: 2018-11-08
Attending: INTERNAL MEDICINE
Payer: COMMERCIAL

## 2018-11-08 DIAGNOSIS — J43.9 PULMONARY EMPHYSEMA, UNSPECIFIED EMPHYSEMA TYPE (HCC): ICD-10-CM

## 2018-11-08 PROCEDURE — 94729 DIFFUSING CAPACITY: CPT | Performed by: INTERNAL MEDICINE

## 2018-11-08 PROCEDURE — 94060 EVALUATION OF WHEEZING: CPT

## 2018-11-08 PROCEDURE — 94760 N-INVAS EAR/PLS OXIMETRY 1: CPT

## 2018-11-08 PROCEDURE — 94726 PLETHYSMOGRAPHY LUNG VOLUMES: CPT

## 2018-11-08 PROCEDURE — 94726 PLETHYSMOGRAPHY LUNG VOLUMES: CPT | Performed by: INTERNAL MEDICINE

## 2018-11-08 PROCEDURE — 94729 DIFFUSING CAPACITY: CPT

## 2018-11-08 PROCEDURE — 94060 EVALUATION OF WHEEZING: CPT | Performed by: INTERNAL MEDICINE

## 2018-11-08 RX ORDER — ALBUTEROL SULFATE 2.5 MG/3ML
2.5 SOLUTION RESPIRATORY (INHALATION) ONCE
Status: DISCONTINUED | OUTPATIENT
Start: 2018-11-08 | End: 2018-11-12 | Stop reason: HOSPADM

## 2018-11-14 DIAGNOSIS — J44.9 CHRONIC OBSTRUCTIVE PULMONARY DISEASE, UNSPECIFIED COPD TYPE (HCC): Primary | ICD-10-CM

## 2018-11-26 RX ORDER — CYCLOBENZAPRINE HCL 10 MG
TABLET ORAL
Qty: 40 TABLET | Refills: 1 | OUTPATIENT
Start: 2018-11-26

## 2019-01-24 ENCOUNTER — OFFICE VISIT (OUTPATIENT)
Dept: PULMONOLOGY | Facility: MEDICAL CENTER | Age: 44
End: 2019-01-24
Payer: COMMERCIAL

## 2019-01-24 VITALS
BODY MASS INDEX: 26.53 KG/M2 | WEIGHT: 169 LBS | HEART RATE: 84 BPM | OXYGEN SATURATION: 98 % | RESPIRATION RATE: 12 BRPM | HEIGHT: 67 IN | SYSTOLIC BLOOD PRESSURE: 130 MMHG | TEMPERATURE: 98.6 F | DIASTOLIC BLOOD PRESSURE: 82 MMHG

## 2019-01-24 DIAGNOSIS — Z71.6 ENCOUNTER FOR SMOKING CESSATION COUNSELING: ICD-10-CM

## 2019-01-24 DIAGNOSIS — J44.9 CHRONIC OBSTRUCTIVE PULMONARY DISEASE, UNSPECIFIED COPD TYPE (HCC): ICD-10-CM

## 2019-01-24 PROCEDURE — 99213 OFFICE O/P EST LOW 20 MIN: CPT | Performed by: INTERNAL MEDICINE

## 2019-01-24 RX ORDER — BUPROPION HYDROCHLORIDE 150 MG/1
150 TABLET ORAL DAILY
Qty: 30 TABLET | Refills: 5 | Status: SHIPPED | OUTPATIENT
Start: 2019-01-24

## 2019-01-24 NOTE — ASSESSMENT & PLAN NOTE
Moderate COPD with FEV1 51%  Good response to Spiriva  Continue with this at this time  Continued smoking cessation efforts advised  Wellbutrin is refilled

## 2019-01-24 NOTE — PROGRESS NOTES
Assessment/Plan:     Problem List Items Addressed This Visit        Respiratory    Chronic obstructive pulmonary disease ([facility] )     Moderate COPD with FEV1 51%  Good response to Spiriva  Continue with this at this time  Continued smoking cessation efforts advised  Wellbutrin is refilled  Relevant Medications    tiotropium (SPIRIVA) 18 mcg inhalation capsule      Other Visit Diagnoses     Encounter for smoking cessation counseling        Relevant Medications    buPROPion (WELLBUTRIN XL) 150 mg 24 hr tablet          If symptoms of sinus congestion or overall respiratory symptoms worsen she is advised to call the office  F/u 3 months  All questions are answered to the patient's satisfaction and understanding  She verbalizes understanding  She is encouraged to call with any further questions or concerns  Portions of the record may have been created with voice recognition software  Occasional wrong word or "sound a like" substitutions may have occurred due to the inherent limitations of voice recognition software  Read the chart carefully and recognize, using context, where substitutions have occurred  Electronically Signed by Abimael Coleman MD    ______________________________________________________________________    Chief Complaint:   Chief Complaint   Patient presents with    Follow-up     3 M        Patient ID: Fabrizio Martinez is a 37 y o  y o  female has a past medical history of Breast abscess; Chronic obstructive lung disease ([facility] ); Cystic breast, unspecified laterality; Elderly multigravida with antepartum condition or complication; Esophageal reflux; Hypothyroidism; Migraine; Nonpuerperal abscess of breast; and Subclinical hyperthyroidism  1/24/2019  Patient presents today for follow-up visit  Breathing and cough much improved with Spiriva  Now she feels like she may be developing a sinus infection  Down to 5 cigarettes per day  Needs Wellbutrin refill     Denies any shortness of breath or wheezing  HPI    Review of Systems   Constitutional: Negative for fever  All other systems reviewed and are negative  Smoking history: She reports that she has been smoking  She has a 7 50 pack-year smoking history  She has never used smokeless tobacco       Immunization History   Administered Date(s) Administered    Influenza TIV (IM) 12/09/2011    Meningococcal, Unknown Serogroups 12/12/2011    Tdap 03/22/2013     Current Outpatient Prescriptions   Medication Sig Dispense Refill    buPROPion (WELLBUTRIN XL) 150 mg 24 hr tablet Take 1 tablet (150 mg total) by mouth daily 30 tablet 5    tiotropium (SPIRIVA) 18 mcg inhalation capsule Place 1 capsule (18 mcg total) into inhaler and inhale daily 30 capsule 3    albuterol (2 5 mg/3 mL) 0 083 % nebulizer solution Take 1 vial (2 5 mg total) by nebulization every 4 (four) hours as needed for wheezing (Patient not taking: Reported on 1/24/2019 ) 10 mL 5    albuterol (PROVENTIL HFA,VENTOLIN HFA) 90 mcg/act inhaler Inhale 2 puffs every 6 (six) hours as needed for wheezing (Patient not taking: Reported on 1/24/2019 ) 1 Inhaler 5    azelastine (ASTELIN) 0 1 % nasal spray 1 spray into each nostril 2 (two) times a day Use in each nostril as directed (Patient not taking: Reported on 1/24/2019 ) 30 mL 0     No current facility-administered medications for this visit  Allergies: Penicillins and Sulfamethoxazole-trimethoprim    Objective:  Vitals:    01/24/19 1005 01/24/19 1013   BP: 160/96 130/82   BP Location: Right arm Left arm   Patient Position: Sitting Sitting   Cuff Size: Standard Standard   Pulse: 84    Resp: 12    Temp: 98 6 °F (37 °C)    TempSrc: Tympanic    SpO2: 98%    Weight: 76 7 kg (169 lb)    Height: 5' 7" (1 702 m)    Oxygen Therapy  SpO2: 98 %    Wt Readings from Last 3 Encounters:   01/24/19 76 7 kg (169 lb)   10/24/18 69 9 kg (154 lb)   09/20/18 70 3 kg (154 lb 14 4 oz)     Body mass index is 26 47 kg/m²      Physical Exam Constitutional: She is oriented to person, place, and time  She appears well-developed and well-nourished  No distress  HENT:   Head: Normocephalic and atraumatic  Mouth/Throat: Oropharynx is clear and moist  No oropharyngeal exudate  Eyes: Pupils are equal, round, and reactive to light  EOM are normal    Neck: Normal range of motion  Neck supple  Cardiovascular: Normal rate and regular rhythm  No murmur heard  Pulmonary/Chest: Effort normal  No respiratory distress  She has no wheezes  She has no rales  She exhibits no tenderness  Diminished breath sounds bilaterally   Abdominal: Soft  Bowel sounds are normal  She exhibits no distension  There is no tenderness  Musculoskeletal: Normal range of motion  She exhibits no edema  Lymphadenopathy:     She has no cervical adenopathy  Neurological: She is alert and oriented to person, place, and time  No cranial nerve deficit  Skin: Skin is warm and dry  She is not diaphoretic  Psychiatric: She has a normal mood and affect  Her behavior is normal    Vitals reviewed  Lab Review:   Reviewed     Diagnostics:  I have personally reviewed pertinent reports

## 2022-12-13 ENCOUNTER — OFFICE VISIT (OUTPATIENT)
Dept: FAMILY MEDICINE CLINIC | Facility: CLINIC | Age: 47
End: 2022-12-13

## 2022-12-13 VITALS
TEMPERATURE: 97.8 F | HEIGHT: 66 IN | HEART RATE: 68 BPM | SYSTOLIC BLOOD PRESSURE: 158 MMHG | WEIGHT: 137 LBS | BODY MASS INDEX: 22.02 KG/M2 | DIASTOLIC BLOOD PRESSURE: 94 MMHG | RESPIRATION RATE: 18 BRPM

## 2022-12-13 DIAGNOSIS — Z12.31 ENCOUNTER FOR SCREENING MAMMOGRAM FOR MALIGNANT NEOPLASM OF BREAST: ICD-10-CM

## 2022-12-13 DIAGNOSIS — F31.81 BIPOLAR 2 DISORDER (HCC): ICD-10-CM

## 2022-12-13 DIAGNOSIS — R03.0 ELEVATED BLOOD PRESSURE READING: ICD-10-CM

## 2022-12-13 DIAGNOSIS — J43.9 PULMONARY EMPHYSEMA, UNSPECIFIED EMPHYSEMA TYPE (HCC): ICD-10-CM

## 2022-12-13 DIAGNOSIS — L98.9 SKIN LESION: ICD-10-CM

## 2022-12-13 DIAGNOSIS — Z13.6 SCREENING FOR CARDIOVASCULAR CONDITION: ICD-10-CM

## 2022-12-13 DIAGNOSIS — Z13.29 SCREENING FOR THYROID DISORDER: ICD-10-CM

## 2022-12-13 DIAGNOSIS — F17.210 CIGARETTE NICOTINE DEPENDENCE WITHOUT COMPLICATION: Primary | ICD-10-CM

## 2022-12-13 DIAGNOSIS — J44.9 CHRONIC OBSTRUCTIVE PULMONARY DISEASE, UNSPECIFIED COPD TYPE (HCC): ICD-10-CM

## 2022-12-13 PROBLEM — S22.32XK CLOSED FRACTURE OF ONE RIB OF LEFT SIDE WITH NONUNION: Status: RESOLVED | Noted: 2018-06-11 | Resolved: 2022-12-13

## 2022-12-13 RX ORDER — VARENICLINE TARTRATE 25 MG
KIT ORAL
Qty: 53 EACH | Refills: 0 | Status: SHIPPED | OUTPATIENT
Start: 2022-12-13 | End: 2023-01-19

## 2022-12-13 RX ORDER — ALBUTEROL SULFATE 90 UG/1
2 AEROSOL, METERED RESPIRATORY (INHALATION) EVERY 6 HOURS PRN
Qty: 8 G | Refills: 1 | Status: SHIPPED | OUTPATIENT
Start: 2022-12-13

## 2022-12-13 RX ORDER — VENLAFAXINE HYDROCHLORIDE 75 MG/1
75 CAPSULE, EXTENDED RELEASE ORAL
Qty: 30 CAPSULE | Refills: 1 | Status: SHIPPED | OUTPATIENT
Start: 2022-12-13

## 2022-12-13 NOTE — ASSESSMENT & PLAN NOTE
May have element of white coat hypertension and may also be exacerbated by recent anxiety  Will have her f/u 6 weeks for labs and BP check

## 2022-12-13 NOTE — PROGRESS NOTES
Assessment/Plan:    1  Cigarette nicotine dependence without complication  Assessment & Plan:  30 year pack history  Did quit cold turkey in the past and has also done well with Chantix  Was given Wellbutrin, but does not recall taking this  Will refill Chantix  Orders:  -     varenicline 0 5 MG X 11 & 1 MG X 42 tablet therapy pack; Take 0 5 mg by mouth daily for 3 days, THEN 0 5 mg 2 (two) times a day for 4 days, THEN 1 mg 2 (two) times a day  2  Bipolar 2 disorder ([place] [address] )  Assessment & Plan:  Has not been on medication, was previously on Effexor, stopped due to insurance changes  Would like to resume  Orders:  -     venlafaxine (EFFEXOR-XR) 75 mg 24 hr capsule; Take 1 capsule (75 mg total) by mouth daily with breakfast    3  Chronic obstructive pulmonary disease, unspecified COPD type ([facility] )  Assessment & Plan:  Was previously seen by pulmonology, did well on Spiriva  Inhalers refilled  Orders:  -     tiotropium (SPIRIVA) 18 mcg inhalation capsule; Place 1 capsule (18 mcg total) into inhaler and inhale daily    4  Pulmonary emphysema, unspecified emphysema type [facility])  Assessment & Plan:  Was previously seen by pulmonology, did well on Spiriva  Inhalers refilled  Orders:  -     albuterol (PROVENTIL HFA,VENTOLIN HFA) 90 mcg/act inhaler; Inhale 2 puffs every 6 (six) hours as needed for wheezing    5  Elevated blood pressure reading  Assessment & Plan:  May have element of white coat hypertension and may also be exacerbated by recent anxiety  Will have her f/u 6 weeks for labs and BP check  6  Screening for cardiovascular condition  -     CBC and differential; Future  -     Comprehensive metabolic panel; Future  -     Lipid panel; Future  -     CBC and differential  -     Comprehensive metabolic panel  -     Lipid panel    7  Screening for thyroid disorder  -     TSH, 3rd generation with Free T4 reflex; Future  -     TSH, 3rd generation with Free T4 reflex    8   Encounter for screening mammogram for malignant neoplasm of breast  -     Mammo screening bilateral w 3d & cad; Future; Expected date: 12/13/2022    9  Skin lesion  Comments:  appears to be seborrheic keratosis  will reassess at f/u visit          Tobacco Cessation Counseling: Tobacco cessation counseling was provided  Medication options discussed  Varenicline (chantix) was prescribed  There are no Patient Instructions on file for this visit  Return in about 6 weeks (around 1/24/2023)  Subjective:      Patient ID: Marga Ramachandran is a 52 y o  female  Chief Complaint   Patient presents with   • Establish Care     Nm lpn       Here today to establish care  History of COPD and   Has a lesion on her back that was very itchy and then she noticed a mole that she scratched off  Would like this checked  Smoking 3/4 ppd, has quit before  Has been under a lot of stress        The following portions of the patient's history were reviewed and updated as appropriate: allergies, current medications, past family history, past medical history, past social history, past surgical history and problem list     Review of Systems   Constitutional: Negative  Respiratory: Negative  Cardiovascular: Negative  Gastrointestinal: Negative  Neurological: Negative  Psychiatric/Behavioral:        See HPI         Current Outpatient Medications   Medication Sig Dispense Refill   • albuterol (PROVENTIL HFA,VENTOLIN HFA) 90 mcg/act inhaler Inhale 2 puffs every 6 (six) hours as needed for wheezing 8 g 1   • tiotropium (SPIRIVA) 18 mcg inhalation capsule Place 1 capsule (18 mcg total) into inhaler and inhale daily 30 capsule 5   • varenicline 0 5 MG X 11 & 1 MG X 42 tablet therapy pack Take 0 5 mg by mouth daily for 3 days, THEN 0 5 mg 2 (two) times a day for 4 days, THEN 1 mg 2 (two) times a day   53 each 0   • venlafaxine (EFFEXOR-XR) 75 mg 24 hr capsule Take 1 capsule (75 mg total) by mouth daily with breakfast 30 capsule 1 No current facility-administered medications for this visit  Objective:    /94   Pulse 68   Temp 97 8 °F (36 6 °C)   Resp 18   Ht 5' 5 5" (1 664 m)   Wt 62 1 kg (137 lb)   LMP 12/04/2022 (Exact Date)   BMI 22 45 kg/m²        Physical Exam  Vitals and nursing note reviewed  Constitutional:       Appearance: Normal appearance  She is well-developed  HENT:      Head: Normocephalic and atraumatic  Mouth/Throat:      Pharynx: Oropharynx is clear  Neck:      Vascular: No carotid bruit  Cardiovascular:      Rate and Rhythm: Normal rate and regular rhythm  Pulses: Normal pulses  Heart sounds: Normal heart sounds  No murmur heard  Pulmonary:      Effort: Pulmonary effort is normal       Breath sounds: Normal breath sounds  Skin:     General: Skin is warm and dry  Neurological:      Mental Status: She is alert     Psychiatric:         Mood and Affect: Mood normal          Behavior: Behavior normal                 Su Matters, YANNA

## 2022-12-13 NOTE — ASSESSMENT & PLAN NOTE
30 year pack history  Did quit cold turkey in the past and has also done well with Chantix  Was given Wellbutrin, but does not recall taking this  Will refill Chantix

## 2022-12-13 NOTE — ASSESSMENT & PLAN NOTE
Has not been on medication, was previously on Effexor, stopped due to insurance changes  Would like to resume

## 2023-01-09 ENCOUNTER — TELEPHONE (OUTPATIENT)
Dept: FAMILY MEDICINE CLINIC | Facility: CLINIC | Age: 48
End: 2023-01-09

## 2023-01-09 DIAGNOSIS — Z12.31 ENCOUNTER FOR SCREENING MAMMOGRAM FOR MALIGNANT NEOPLASM OF BREAST: ICD-10-CM

## 2023-01-09 DIAGNOSIS — R92.8 ABNORMAL MAMMOGRAM: Primary | ICD-10-CM

## 2023-01-09 NOTE — TELEPHONE ENCOUNTER
Pt recently went for a mammo and she just heard from them that she needs additional testing  We should have those results  Pt says she needs an order in the system for this and does Dolly want her to have this done before her next apt here which is at the end of the month?  Please call this afternoon

## 2023-01-09 NOTE — TELEPHONE ENCOUNTER
Spoke w/ pt regarding results, needs f/u imaging bilateral breast masses  Orders entered, she will have someone pick them up from the office later today  Please leave up front  Thanks!

## 2023-01-22 LAB
ALBUMIN SERPL-MCNC: 4.4 G/DL (ref 3.8–4.8)
ALBUMIN/GLOB SERPL: 1.8 {RATIO} (ref 1.2–2.2)
ALP SERPL-CCNC: 81 IU/L (ref 44–121)
ALT SERPL-CCNC: 9 IU/L (ref 0–32)
AST SERPL-CCNC: 14 IU/L (ref 0–40)
BASOPHILS # BLD AUTO: 0.1 X10E3/UL (ref 0–0.2)
BASOPHILS NFR BLD AUTO: 1 %
BILIRUB SERPL-MCNC: 0.5 MG/DL (ref 0–1.2)
BUN SERPL-MCNC: 6 MG/DL (ref 6–24)
BUN/CREAT SERPL: 8 (ref 9–23)
CALCIUM SERPL-MCNC: 9.6 MG/DL (ref 8.7–10.2)
CHLORIDE SERPL-SCNC: 102 MMOL/L (ref 96–106)
CHOLEST SERPL-MCNC: 186 MG/DL (ref 100–199)
CHOLEST/HDLC SERPL: 4.5 RATIO (ref 0–4.4)
CO2 SERPL-SCNC: 26 MMOL/L (ref 20–29)
CREAT SERPL-MCNC: 0.71 MG/DL (ref 0.57–1)
EGFR: 105 ML/MIN/1.73
EOSINOPHIL # BLD AUTO: 0.2 X10E3/UL (ref 0–0.4)
EOSINOPHIL NFR BLD AUTO: 3 %
ERYTHROCYTE [DISTWIDTH] IN BLOOD BY AUTOMATED COUNT: 12.1 % (ref 11.7–15.4)
GLOBULIN SER-MCNC: 2.4 G/DL (ref 1.5–4.5)
GLUCOSE SERPL-MCNC: 87 MG/DL (ref 70–99)
HCT VFR BLD AUTO: 42.3 % (ref 34–46.6)
HDLC SERPL-MCNC: 41 MG/DL
HGB BLD-MCNC: 14.3 G/DL (ref 11.1–15.9)
IMM GRANULOCYTES # BLD: 0 X10E3/UL (ref 0–0.1)
IMM GRANULOCYTES NFR BLD: 0 %
LDLC SERPL CALC-MCNC: 127 MG/DL (ref 0–99)
LYMPHOCYTES # BLD AUTO: 2 X10E3/UL (ref 0.7–3.1)
LYMPHOCYTES NFR BLD AUTO: 33 %
MCH RBC QN AUTO: 32.1 PG (ref 26.6–33)
MCHC RBC AUTO-ENTMCNC: 33.8 G/DL (ref 31.5–35.7)
MCV RBC AUTO: 95 FL (ref 79–97)
MICRODELETION SYND BLD/T FISH: NORMAL
MONOCYTES # BLD AUTO: 0.5 X10E3/UL (ref 0.1–0.9)
MONOCYTES NFR BLD AUTO: 9 %
NEUTROPHILS # BLD AUTO: 3.3 X10E3/UL (ref 1.4–7)
NEUTROPHILS NFR BLD AUTO: 54 %
PLATELET # BLD AUTO: 289 X10E3/UL (ref 150–450)
POTASSIUM SERPL-SCNC: 4.2 MMOL/L (ref 3.5–5.2)
PROT SERPL-MCNC: 6.8 G/DL (ref 6–8.5)
RBC # BLD AUTO: 4.45 X10E6/UL (ref 3.77–5.28)
SL AMB VLDL CHOLESTEROL CALC: 18 MG/DL (ref 5–40)
SODIUM SERPL-SCNC: 140 MMOL/L (ref 134–144)
TRIGL SERPL-MCNC: 100 MG/DL (ref 0–149)
TSH SERPL DL<=0.005 MIU/L-ACNC: 1.39 UIU/ML (ref 0.45–4.5)
WBC # BLD AUTO: 6.1 X10E3/UL (ref 3.4–10.8)

## 2023-01-24 ENCOUNTER — OFFICE VISIT (OUTPATIENT)
Dept: FAMILY MEDICINE CLINIC | Facility: CLINIC | Age: 48
End: 2023-01-24

## 2023-01-24 VITALS
RESPIRATION RATE: 16 BRPM | DIASTOLIC BLOOD PRESSURE: 70 MMHG | SYSTOLIC BLOOD PRESSURE: 120 MMHG | TEMPERATURE: 97.2 F | HEART RATE: 77 BPM | BODY MASS INDEX: 22.02 KG/M2 | OXYGEN SATURATION: 99 % | HEIGHT: 66 IN | WEIGHT: 137 LBS

## 2023-01-24 DIAGNOSIS — F31.81 BIPOLAR 2 DISORDER (HCC): ICD-10-CM

## 2023-01-24 DIAGNOSIS — J43.9 PULMONARY EMPHYSEMA, UNSPECIFIED EMPHYSEMA TYPE (HCC): ICD-10-CM

## 2023-01-24 DIAGNOSIS — Z12.11 SCREENING FOR COLON CANCER: ICD-10-CM

## 2023-01-24 DIAGNOSIS — R92.8 ABNORMAL MAMMOGRAM: Primary | ICD-10-CM

## 2023-01-24 DIAGNOSIS — F17.210 CIGARETTE NICOTINE DEPENDENCE WITHOUT COMPLICATION: ICD-10-CM

## 2023-01-24 PROBLEM — R09.82 POST-NASAL DRIP: Status: RESOLVED | Noted: 2018-10-24 | Resolved: 2023-01-24

## 2023-01-24 PROBLEM — R03.0 ELEVATED BLOOD PRESSURE READING: Status: RESOLVED | Noted: 2022-12-13 | Resolved: 2023-01-24

## 2023-01-24 RX ORDER — VENLAFAXINE HYDROCHLORIDE 150 MG/1
150 CAPSULE, EXTENDED RELEASE ORAL
Qty: 90 CAPSULE | Refills: 1 | Status: SHIPPED | OUTPATIENT
Start: 2023-01-24

## 2023-01-24 NOTE — PROGRESS NOTES
Assessment/Plan:    1  Abnormal mammogram  -     Mammo diagnostic left w cad; Future; Expected date: 07/24/2023  -     US breast left limited (diagnostic); Future; Expected date: 07/24/2023    2  Bipolar 2 disorder ([facility] )  Assessment & Plan:  Improving on Venlafaxine  Was previously on 150mg in the past   New rx sent for higher dosage     Orders:  -     venlafaxine (EFFEXOR-XR) 150 mg 24 hr capsule; Take 1 capsule (150 mg total) by mouth daily with breakfast    3  Pulmonary emphysema, unspecified emphysema type ([address] )  Assessment & Plan:  Cont Spiriva and albuterol prn        4  Screening for colon cancer  -     Ambulatory referral for colonoscopy; Future    5  Cigarette nicotine dependence without complication  Assessment & Plan: Will be starting Chantix on 2/1  To let me know when she needs the continuing dosage               There are no Patient Instructions on file for this visit  Return in about 6 months (around 7/24/2023)  Subjective:      Patient ID: Eran Whiting is a 52 y o  female  Chief Complaint   Patient presents with   • Follow-up     On meds  Cristina Rodriguez MA         Here today for med review and follow up  Bipolar disorder/depression-improving since resuming Venlafaxine  Was previously on 150mg, would like to titrate up  COPD- using Spiriva consistently, no recent exacerbations  Abnormal mammogram- had repeat imaging done yesterday, was told she needs diagnostic mammo and US of left breast in 6 months for complex cyst    Labs done prior, no other concerns today       The following portions of the patient's history were reviewed and updated as appropriate: allergies, current medications, past family history, past medical history, past social history, past surgical history and problem list     Review of Systems   Constitutional: Negative for chills, fatigue and fever  HENT: Negative for congestion, ear pain, postnasal drip, rhinorrhea, sinus pressure and sore throat      Respiratory: Negative for cough, shortness of breath and wheezing  Cardiovascular: Negative for chest pain  Gastrointestinal: Negative for abdominal pain, diarrhea, nausea and vomiting  Musculoskeletal: Negative for arthralgias  Skin: Negative for rash  Neurological: Negative for headaches  Current Outpatient Medications   Medication Sig Dispense Refill   • albuterol (PROVENTIL HFA,VENTOLIN HFA) 90 mcg/act inhaler Inhale 2 puffs every 6 (six) hours as needed for wheezing 8 g 1   • tiotropium (SPIRIVA) 18 mcg inhalation capsule Place 1 capsule (18 mcg total) into inhaler and inhale daily 30 capsule 5   • venlafaxine (EFFEXOR-XR) 150 mg 24 hr capsule Take 1 capsule (150 mg total) by mouth daily with breakfast 90 capsule 1   • varenicline 0 5 MG X 11 & 1 MG X 42 tablet therapy pack Take 0 5 mg by mouth daily for 3 days, THEN 0 5 mg 2 (two) times a day for 4 days, THEN 1 mg 2 (two) times a day  (Patient not taking: Reported on 1/24/2023) 53 each 0     No current facility-administered medications for this visit  Objective:    /70   Pulse 77   Temp (!) 97 2 °F (36 2 °C)   Resp 16   Ht 5' 6" (1 676 m)   Wt 62 1 kg (137 lb)   LMP 01/23/2023   SpO2 99%   BMI 22 11 kg/m²        Physical Exam  Vitals and nursing note reviewed  Constitutional:       Appearance: Normal appearance  She is well-developed  Cardiovascular:      Rate and Rhythm: Normal rate and regular rhythm  Heart sounds: Normal heart sounds  No murmur heard  Pulmonary:      Effort: Pulmonary effort is normal       Breath sounds: Normal breath sounds  Skin:     General: Skin is warm and dry  Neurological:      Mental Status: She is alert     Psychiatric:         Mood and Affect: Mood normal          Behavior: Behavior normal                 YANNA Mondragon

## 2023-02-17 ENCOUNTER — TELEPHONE (OUTPATIENT)
Dept: FAMILY MEDICINE CLINIC | Facility: CLINIC | Age: 48
End: 2023-02-17

## 2023-02-17 DIAGNOSIS — F17.210 CIGARETTE NICOTINE DEPENDENCE WITHOUT COMPLICATION: Primary | ICD-10-CM

## 2023-02-20 RX ORDER — VARENICLINE TARTRATE 1 MG/1
1 TABLET, FILM COATED ORAL 2 TIMES DAILY
Qty: 56 TABLET | Refills: 1 | Status: SHIPPED | OUTPATIENT
Start: 2023-02-20 | End: 2023-03-22

## 2023-06-05 DIAGNOSIS — J44.9 CHRONIC OBSTRUCTIVE PULMONARY DISEASE, UNSPECIFIED COPD TYPE (HCC): ICD-10-CM

## 2023-06-05 RX ORDER — TIOTROPIUM BROMIDE 18 UG/1
CAPSULE ORAL; RESPIRATORY (INHALATION)
Qty: 30 CAPSULE | Refills: 5 | Status: SHIPPED | OUTPATIENT
Start: 2023-06-05

## 2023-07-24 ENCOUNTER — OFFICE VISIT (OUTPATIENT)
Dept: FAMILY MEDICINE CLINIC | Facility: CLINIC | Age: 48
End: 2023-07-24
Payer: COMMERCIAL

## 2023-07-24 VITALS
HEART RATE: 80 BPM | RESPIRATION RATE: 16 BRPM | TEMPERATURE: 98.1 F | WEIGHT: 134 LBS | BODY MASS INDEX: 21.63 KG/M2 | DIASTOLIC BLOOD PRESSURE: 88 MMHG | SYSTOLIC BLOOD PRESSURE: 120 MMHG

## 2023-07-24 DIAGNOSIS — F17.210 CIGARETTE NICOTINE DEPENDENCE WITHOUT COMPLICATION: ICD-10-CM

## 2023-07-24 DIAGNOSIS — F31.81 BIPOLAR 2 DISORDER (HCC): ICD-10-CM

## 2023-07-24 DIAGNOSIS — J43.9 PULMONARY EMPHYSEMA, UNSPECIFIED EMPHYSEMA TYPE (HCC): ICD-10-CM

## 2023-07-24 DIAGNOSIS — Z12.11 SCREENING FOR COLON CANCER: ICD-10-CM

## 2023-07-24 DIAGNOSIS — J41.0 SIMPLE CHRONIC BRONCHITIS (HCC): Primary | ICD-10-CM

## 2023-07-24 DIAGNOSIS — F32.A DEPRESSION, UNSPECIFIED DEPRESSION TYPE: ICD-10-CM

## 2023-07-24 DIAGNOSIS — R92.8 ABNORMAL MAMMOGRAM: ICD-10-CM

## 2023-07-24 DIAGNOSIS — Z13.6 SCREENING FOR CARDIOVASCULAR CONDITION: ICD-10-CM

## 2023-07-24 DIAGNOSIS — Z13.29 SCREENING FOR THYROID DISORDER: ICD-10-CM

## 2023-07-24 PROCEDURE — 99214 OFFICE O/P EST MOD 30 MIN: CPT | Performed by: NURSE PRACTITIONER

## 2023-07-24 RX ORDER — VENLAFAXINE HYDROCHLORIDE 150 MG/1
150 CAPSULE, EXTENDED RELEASE ORAL
Qty: 90 CAPSULE | Refills: 1 | Status: SHIPPED | OUTPATIENT
Start: 2023-07-24

## 2023-07-24 RX ORDER — ALBUTEROL SULFATE 90 UG/1
2 AEROSOL, METERED RESPIRATORY (INHALATION) EVERY 6 HOURS PRN
Qty: 8 G | Refills: 1 | Status: SHIPPED | OUTPATIENT
Start: 2023-07-24

## 2023-07-24 NOTE — PROGRESS NOTES
Assessment/Plan:    1. Simple chronic bronchitis ([address])  Assessment & Plan:  Stable on Spiriva. Has not been requiring Albuterol frequently. 2. Cigarette nicotine dependence without complication  Assessment & Plan:  Was able to cut back on Chantix, but did not fully quit. Not back to smoking almost 1 ppd. She would like to revisit Chantix, but does not feel right now is a good time. 3. Bipolar 2 disorder (HCC)  Assessment & Plan:  Stable on Venlafaxine     Orders:  -     venlafaxine (EFFEXOR-XR) 150 mg 24 hr capsule; Take 1 capsule (150 mg total) by mouth daily with breakfast    4. Pulmonary emphysema, unspecified emphysema type ([address])  Assessment & Plan:  Stable on Spiriva. Has not been requiring Albuterol frequently. Orders:  -     albuterol (PROVENTIL HFA,VENTOLIN HFA) 90 mcg/act inhaler; Inhale 2 puffs every 6 (six) hours as needed for wheezing    5. Screening for thyroid disorder  -     TSH, 3rd generation with Free T4 reflex; Future  -     TSH, 3rd generation with Free T4 reflex    6. Screening for cardiovascular condition  -     Comprehensive metabolic panel; Future  -     CBC and differential; Future  -     Lipid panel; Future  -     Comprehensive metabolic panel  -     CBC and differential  -     Lipid panel    7. Screening for colon cancer  -     Ambulatory referral to Gastroenterology; Future    8. Depression, unspecified depression type  Assessment & Plan:  Stable on Venlafaxine       9. Abnormal mammogram  Comments:  reminded to schedule repeat imaging. There are no Patient Instructions on file for this visit. Return in about 6 months (around 1/24/2024) for Annual physical.    Subjective:      Patient ID: Ritchie Anaya is a 50 y.o. female. Chief Complaint   Patient presents with   • Follow-up   • COPD     Sas/cma       Here today for follow up. Doing well overall  COPD stable. Still smoking, did well on Chantix, down to 5 cigs, but was unable to quit entirely. Mood stable on Venlafaxine. No concerns       The following portions of the patient's history were reviewed and updated as appropriate: allergies, current medications, past family history, past medical history, past social history, past surgical history and problem list.    Review of Systems   Constitutional: Negative. Respiratory: Negative. Cardiovascular: Negative. Gastrointestinal: Negative. Psychiatric/Behavioral:        See HPI         Current Outpatient Medications   Medication Sig Dispense Refill   • albuterol (PROVENTIL HFA,VENTOLIN HFA) 90 mcg/act inhaler Inhale 2 puffs every 6 (six) hours as needed for wheezing 8 g 1   • Spiriva HandiHaler 18 MCG inhalation capsule inhale THE CONTENTS OF ONE CAPSULE IN THE HANDIHALER once daily 30 capsule 5   • venlafaxine (EFFEXOR-XR) 150 mg 24 hr capsule Take 1 capsule (150 mg total) by mouth daily with breakfast 90 capsule 1   • varenicline (CHANTIX) 1 mg tablet Take 1 tablet (1 mg total) by mouth 2 (two) times a day 56 tablet 1     No current facility-administered medications for this visit. Objective:    /88   Pulse 80   Temp 98.1 °F (36.7 °C)   Resp 16   Wt 60.8 kg (134 lb)   LMP 07/07/2023 (Exact Date)   BMI 21.63 kg/m²        Physical Exam  Vitals and nursing note reviewed. Constitutional:       Appearance: Normal appearance. She is well-developed. Cardiovascular:      Rate and Rhythm: Normal rate and regular rhythm. Pulses: Normal pulses. Heart sounds: Normal heart sounds. No murmur heard. Pulmonary:      Effort: Pulmonary effort is normal.      Breath sounds: Normal breath sounds. Skin:     General: Skin is warm and dry. Neurological:      Mental Status: She is alert.    Psychiatric:         Mood and Affect: Mood normal.         Behavior: Behavior normal.                YANNA Hiutron

## 2023-07-24 NOTE — ASSESSMENT & PLAN NOTE
Was able to cut back on Chantix, but did not fully quit. Not back to smoking almost 1 ppd. She would like to revisit Chantix, but does not feel right now is a good time.

## 2023-07-31 DIAGNOSIS — F31.81 BIPOLAR 2 DISORDER (HCC): ICD-10-CM

## 2023-07-31 RX ORDER — VENLAFAXINE HYDROCHLORIDE 150 MG/1
CAPSULE, EXTENDED RELEASE ORAL
Qty: 90 CAPSULE | Refills: 1 | OUTPATIENT
Start: 2023-07-31

## 2023-08-29 NOTE — PROGRESS NOTES
Diagnoses and all orders for this visit:    Encounter for screening for malignant neoplasm of colon       Encounter for screening for malignant neoplasm of colon  Patient presents for evaluation for colorectal cancer screening as well as perianal concerns. Patient notes since the birth of her child many years ago she has spots on the outside of her anus. She denies rectal bleeding or prolapse. She figured that since it was time for her colonoscopy she would address both of these issues. She does not describe any specific symptoms that are related to her perianal concerns. Examination is declined in office. Colonoscopy is recommended for colorectal cancer screening. Risks and benefits of colonoscopy reviewed with patient to include, but not be limited to: anesthesia, bleeding, missed lesion and perforation requiring surgery. Patient consents to procedure. KRAIG Bernaltorres Graff is here today for evaluation of hemorrhoidal symptoms. The patient notes non painful anal lumps since childbirth 25 years ago; denies rectal bleeding, anal itching or burning. 1 loose bowel movements daily. Past Medical History:   Diagnosis Date   • Breast abscess    • Chronic obstructive lung disease ([address])    • Cystic breast, unspecified laterality    • Elderly multigravida with antepartum condition or complication     last assessed: 2014   • Esophageal reflux     last assessed: 2016   • Hypothyroidism     last assessed: 2014   • Migraine     without aura, intractable  - last assessed: 2014   • Nonpuerperal abscess of breast     last assessed:  Oct 16, 2014   • Subclinical hyperthyroidism     last assessed: 2014     Past Surgical History:   Procedure Laterality Date   • BREAST SURGERY  2014    Managed by: Ronan Flores    •  SECTION     • TONSILLECTOMY      last assessed: May 15, 2017       Current Outpatient Medications:   •  albuterol (PROVENTIL HFA,VENTOLIN HFA) 90 mcg/act inhaler, Inhale 2 puffs every 6 (six) hours as needed for wheezing, Disp: 8 g, Rfl: 1  •  Spiriva HandiHaler 18 MCG inhalation capsule, inhale THE CONTENTS OF ONE CAPSULE IN THE HANDIHALER once daily, Disp: 30 capsule, Rfl: 5  •  varenicline (CHANTIX) 1 mg tablet, Take 1 tablet (1 mg total) by mouth 2 (two) times a day, Disp: 56 tablet, Rfl: 1  •  venlafaxine (EFFEXOR-XR) 150 mg 24 hr capsule, Take 1 capsule (150 mg total) by mouth daily with breakfast, Disp: 90 capsule, Rfl: 1  Allergies as of 08/31/2023 - Reviewed 08/31/2023   Allergen Reaction Noted   • Penicillins  10/16/2014   • Sulfamethoxazole-trimethoprim  10/16/2014     Review of Systems   All other systems reviewed and are negative. Vitals:    08/31/23 1021   BP: 130/74     Physical Exam  Constitutional:       Appearance: Normal appearance. HENT:      Head: Normocephalic and atraumatic. Eyes:      Extraocular Movements: Extraocular movements intact. Pupils: Pupils are equal, round, and reactive to light. Pulmonary:      Effort: Pulmonary effort is normal.   Musculoskeletal:         General: Normal range of motion. Cervical back: Normal range of motion. Skin:     General: Skin is warm and dry. Neurological:      General: No focal deficit present. Mental Status: She is alert and oriented to person, place, and time. Psychiatric:         Mood and Affect: Mood normal.         Behavior: Behavior normal.         Thought Content:  Thought content normal.         Judgment: Judgment normal.

## 2023-08-31 ENCOUNTER — OFFICE VISIT (OUTPATIENT)
Age: 48
End: 2023-08-31
Payer: COMMERCIAL

## 2023-08-31 ENCOUNTER — TELEPHONE (OUTPATIENT)
Age: 48
End: 2023-08-31

## 2023-08-31 VITALS
DIASTOLIC BLOOD PRESSURE: 74 MMHG | SYSTOLIC BLOOD PRESSURE: 130 MMHG | HEIGHT: 66 IN | WEIGHT: 131 LBS | BODY MASS INDEX: 21.05 KG/M2

## 2023-08-31 DIAGNOSIS — Z12.11 ENCOUNTER FOR SCREENING FOR MALIGNANT NEOPLASM OF COLON: Primary | ICD-10-CM

## 2023-08-31 DIAGNOSIS — Z12.11 SCREENING FOR COLON CANCER: Primary | ICD-10-CM

## 2023-08-31 PROCEDURE — 99203 OFFICE O/P NEW LOW 30 MIN: CPT | Performed by: COLON & RECTAL SURGERY

## 2023-08-31 NOTE — LETTER
[place] [id number]        ------------------------------------------------------------------------------------------------------------  FLEXIBLE COLONOSCOPY INSTRUCTIONS  PLEASE NOTE. .. AS OF JUNE 1, 2014, OUR OFFICE REQUIRES 72 HOURS NOTICE OF CANCELLATION/RESCHEDULE OF A PROCEDURE TO AVOID INCURRING A MISSED APPOINTMENT FEE. Your Colonoscopy Procedure has been scheduled at:  [facility]. [facility] [place], [place] [phone]    The Date of your Procedure is: 9/19/2023   Dr. Jose Saucedo  will be performing the procedure. Report to the [facility] 45 minutes prior to the procedure. The total time at the facility will be approximately 1 1/2 hours. Please bring to your procedure at [facility]:   1. Insurance Cards and referrals if required by [employer] company   2. Valid Photo ID   3. Power of  Form if required    Use the bowel preparation as directed. Check with your family doctor if you are taking a blood thinner (Coumadin, Plavix, Xarelto, Pradaxa, Gingko biloba, Ginseng, Feverfew, Cathy's Wort). We suggest stopping these for 3 days. Special instructions may be needed if you are taking aspirin or any aspirin-containing medication. Check with your family physician. If you are on DIABETIC MEDICATION (tablets or insulin) your doctor may make changes in your preparation. Take all medications usual unless otherwise instructed. Feel free to call the physician's office to answer any questions or address any concerns you have regarding your procedure or preparation. A nurse will be happy to assist you. Because anesthesia is given to you during the procedure, the center requires that you be discharged under supervision of an adult to take you home after the procedure is performed. They will also need to sign as a witness on your discharge instructions.   Public Transportation such as VAST, BUS, TAXI is Not Acceptable. It is important you notify our office of any insurance changes prior to your procedure and, if necessary, supply us with referrals from your primary care physician. COLONOSCOPY PREPARATION INSTRUCTIONS    Purchase (prescription not required):  · 238 gram bottle of Miralax® (Glycolax®)  · 4 Dulcolax® (Bisacodyl) Laxative Tablets  · 64 oz. bottle of Gatorade® or your preference of a non-carbonated clear liquid - NOT RED OR PURPLE     One Day Prior to Colonoscopy Procedure  · Nothing to eat the day before your procedure, only clear liquids. · It is important that you drink plenty of clear liquids throughout the day to prevent dehydration. Clear Liquids include:  o Water/Iced Tea/Lemonade/Gatorade®/Black Coffee or tea (no milk or creamers  o Soft drinks: orange, ginger ale, cola, Pepsi®, Sprite®, 7Up®  o Vik-Aid® (lemonade or orange flavors only)  o Strained fruit juices without pulp such as apple, white grape, white cranberry  o Jell-O®, lemon, lime or orange (no fruit or toppings)  o Popsicles, Vitor Ice (No Ice Cream, sherbets, or fruit bars)  o Chicken or beef bouillon/broth  DO NOT EAT OR DRINK ANYTHING RED OR PURPLE  DO NOT DRINK ANY ALCOHOLIC BEVERAGES  DIABETIC PATIENTS: Consult your physician    At 4:00 pm, take (2) Dulcolax® (Bisacodyl) Laxative Tablets. Swallow the tablets whole with an 8 oz. glass of water. At 8:00 pm, take the additional (2) Dulcolax® (Bisacodyl) Laxative Tablets with 8 oz. of water. The package may direct you not to exceed (2) tablets at any time but for the purpose of this examination, you should take (4) total.    Mix the 238 gm of Miralax® in 64 oz. of Gatorade® and shake the solution until the Miralax® is dissolved. You will drink half (32 oz) of this solution this evening, beginning between 4 and 6 o'clock. Drink 8 oz glassfuls at your own pace. It may take several hours to drink the solution.     Remember to stay close to toilet facilities. DAY OF COLONOSCOPY PROCEDURE    Five (5) hours before your procedure, drink the other half (32 oz) of the Miralax®/Gatorade® mixture within a two (2) hour period. This may require you to get up very early if you are scheduled for an early procedure. NOTHING IS TO BE TAKEN BY MOUTH 3 HOURS PRIOR TO PROCEDURE. If you use an inhaler, please bring it with you to your procedure.

## 2023-08-31 NOTE — TELEPHONE ENCOUNTER
OV DB, first colon, BMI 21    Scheduled 9/19/2023 at [facility] w/DB    Paperwork handed to pt

## 2023-08-31 NOTE — ASSESSMENT & PLAN NOTE
Patient presents for evaluation for colorectal cancer screening as well as perianal concerns. Patient notes since the birth of her child many years ago she has spots on the outside of her anus. She denies rectal bleeding or prolapse. She figured that since it was time for her colonoscopy she would address both of these issues. She does not describe any specific symptoms that are related to her perianal concerns. Examination is declined in office. Colonoscopy is recommended for colorectal cancer screening. Risks and benefits of colonoscopy reviewed with patient to include, but not be limited to: anesthesia, bleeding, missed lesion and perforation requiring surgery. Patient consents to procedure.

## 2023-08-31 NOTE — TELEPHONE ENCOUNTER
[address]: [id number], [id number], [id number], [id number], [id number], [email or web address]  LOCATION: [facility]  PER [facility] PRE-CERT TOOL

## 2023-10-10 ENCOUNTER — TELEPHONE (OUTPATIENT)
Dept: FAMILY MEDICINE CLINIC | Facility: CLINIC | Age: 48
End: 2023-10-10

## 2023-10-10 DIAGNOSIS — J41.0 SIMPLE CHRONIC BRONCHITIS (HCC): Primary | ICD-10-CM

## 2023-10-10 RX ORDER — TIOTROPIUM BROMIDE INHALATION SPRAY 1.56 UG/1
2 SPRAY, METERED RESPIRATORY (INHALATION) DAILY
Qty: 4 G | Refills: 5 | Status: SHIPPED | OUTPATIENT
Start: 2023-10-10

## 2023-10-10 NOTE — TELEPHONE ENCOUNTER
I sent the respimat instead of the handihaler. It is the same medicine, just administered differently.     Lisa Carbajal

## 2023-10-10 NOTE — TELEPHONE ENCOUNTER
Patient called stating pharmacy does not have   Spiriva HandiHaler 18 MCG inhalation capsule in stock. She caledl multiple pharmacies and they say the same thing. She is out of her Spiriva since the  weekend.  Could someone from the  office please  contact her [phone]

## 2023-10-30 PROBLEM — Z12.11 ENCOUNTER FOR SCREENING FOR MALIGNANT NEOPLASM OF COLON: Status: RESOLVED | Noted: 2023-08-31 | Resolved: 2023-10-30

## 2023-11-27 DIAGNOSIS — J44.9 CHRONIC OBSTRUCTIVE PULMONARY DISEASE, UNSPECIFIED COPD TYPE (HCC): ICD-10-CM

## 2023-11-27 RX ORDER — TIOTROPIUM BROMIDE 18 UG/1
CAPSULE ORAL; RESPIRATORY (INHALATION)
Qty: 30 CAPSULE | Refills: 5 | Status: SHIPPED | OUTPATIENT
Start: 2023-11-27

## 2024-01-25 DIAGNOSIS — F31.81 BIPOLAR 2 DISORDER (HCC): ICD-10-CM

## 2024-01-25 RX ORDER — VENLAFAXINE HYDROCHLORIDE 150 MG/1
150 CAPSULE, EXTENDED RELEASE ORAL
Qty: 90 CAPSULE | Refills: 1 | Status: SHIPPED | OUTPATIENT
Start: 2024-01-25

## 2024-01-28 LAB
ALBUMIN SERPL-MCNC: 4.2 G/DL (ref 3.9–4.9)
ALBUMIN/GLOB SERPL: 1.6 {RATIO} (ref 1.2–2.2)
ALP SERPL-CCNC: 111 IU/L (ref 44–121)
ALT SERPL-CCNC: 11 IU/L (ref 0–32)
AST SERPL-CCNC: 16 IU/L (ref 0–40)
BASOPHILS # BLD AUTO: 0.1 X10E3/UL (ref 0–0.2)
BASOPHILS NFR BLD AUTO: 2 %
BILIRUB SERPL-MCNC: 0.2 MG/DL (ref 0–1.2)
BUN SERPL-MCNC: 3 MG/DL (ref 6–24)
BUN/CREAT SERPL: 5 (ref 9–23)
CALCIUM SERPL-MCNC: 9.5 MG/DL (ref 8.7–10.2)
CHLORIDE SERPL-SCNC: 100 MMOL/L (ref 96–106)
CHOLEST SERPL-MCNC: 175 MG/DL (ref 100–199)
CHOLEST/HDLC SERPL: 5 RATIO (ref 0–4.4)
CO2 SERPL-SCNC: 24 MMOL/L (ref 20–29)
CREAT SERPL-MCNC: 0.57 MG/DL (ref 0.57–1)
EGFR: 112 ML/MIN/1.73
EOSINOPHIL # BLD AUTO: 0.2 X10E3/UL (ref 0–0.4)
EOSINOPHIL NFR BLD AUTO: 4 %
ERYTHROCYTE [DISTWIDTH] IN BLOOD BY AUTOMATED COUNT: 13 % (ref 11.7–15.4)
GLOBULIN SER-MCNC: 2.7 G/DL (ref 1.5–4.5)
GLUCOSE SERPL-MCNC: 91 MG/DL (ref 70–99)
HCT VFR BLD AUTO: 42.7 % (ref 34–46.6)
HDLC SERPL-MCNC: 35 MG/DL
HGB BLD-MCNC: 14.1 G/DL (ref 11.1–15.9)
IMM GRANULOCYTES # BLD: 0 X10E3/UL (ref 0–0.1)
IMM GRANULOCYTES NFR BLD: 0 %
LDLC SERPL CALC-MCNC: 112 MG/DL (ref 0–99)
LYMPHOCYTES # BLD AUTO: 2.1 X10E3/UL (ref 0.7–3.1)
LYMPHOCYTES NFR BLD AUTO: 39 %
MCH RBC QN AUTO: 31.5 PG (ref 26.6–33)
MCHC RBC AUTO-ENTMCNC: 33 G/DL (ref 31.5–35.7)
MCV RBC AUTO: 95 FL (ref 79–97)
MICRODELETION SYND BLD/T FISH: NORMAL
MONOCYTES # BLD AUTO: 0.5 X10E3/UL (ref 0.1–0.9)
MONOCYTES NFR BLD AUTO: 10 %
NEUTROPHILS # BLD AUTO: 2.6 X10E3/UL (ref 1.4–7)
NEUTROPHILS NFR BLD AUTO: 45 %
PLATELET # BLD AUTO: 341 X10E3/UL (ref 150–450)
POTASSIUM SERPL-SCNC: 3.9 MMOL/L (ref 3.5–5.2)
PROT SERPL-MCNC: 6.9 G/DL (ref 6–8.5)
RBC # BLD AUTO: 4.48 X10E6/UL (ref 3.77–5.28)
SL AMB VLDL CHOLESTEROL CALC: 28 MG/DL (ref 5–40)
SODIUM SERPL-SCNC: 140 MMOL/L (ref 134–144)
TRIGL SERPL-MCNC: 160 MG/DL (ref 0–149)
TSH SERPL DL<=0.005 MIU/L-ACNC: 1.12 UIU/ML (ref 0.45–4.5)
WBC # BLD AUTO: 5.5 X10E3/UL (ref 3.4–10.8)

## 2024-01-29 ENCOUNTER — OFFICE VISIT (OUTPATIENT)
Dept: FAMILY MEDICINE CLINIC | Facility: CLINIC | Age: 49
End: 2024-01-29
Payer: COMMERCIAL

## 2024-01-29 VITALS
BODY MASS INDEX: 20.73 KG/M2 | RESPIRATION RATE: 16 BRPM | HEIGHT: 66 IN | TEMPERATURE: 97 F | HEART RATE: 76 BPM | WEIGHT: 129 LBS | SYSTOLIC BLOOD PRESSURE: 160 MMHG | DIASTOLIC BLOOD PRESSURE: 92 MMHG

## 2024-01-29 DIAGNOSIS — F31.81 BIPOLAR 2 DISORDER (HCC): ICD-10-CM

## 2024-01-29 DIAGNOSIS — R03.0 ELEVATED BLOOD PRESSURE READING: ICD-10-CM

## 2024-01-29 DIAGNOSIS — J41.0 SIMPLE CHRONIC BRONCHITIS (HCC): ICD-10-CM

## 2024-01-29 DIAGNOSIS — J01.00 ACUTE NON-RECURRENT MAXILLARY SINUSITIS: ICD-10-CM

## 2024-01-29 DIAGNOSIS — F17.210 CIGARETTE NICOTINE DEPENDENCE WITHOUT COMPLICATION: Primary | ICD-10-CM

## 2024-01-29 PROCEDURE — 99214 OFFICE O/P EST MOD 30 MIN: CPT | Performed by: NURSE PRACTITIONER

## 2024-01-29 RX ORDER — AZITHROMYCIN 250 MG/1
TABLET, FILM COATED ORAL
Qty: 6 TABLET | Refills: 0 | Status: SHIPPED | OUTPATIENT
Start: 2024-01-29 | End: 2024-02-03

## 2024-01-29 NOTE — PROGRESS NOTES
Assessment/Plan:    Reminded to schedule breast imaging.     1. Cigarette nicotine dependence without complication  Assessment & Plan:  Not ready to quit.  She will let me know when she would like the Chantix prescribed again      2. Bipolar 2 disorder (HCC)  Assessment & Plan:  Mood stable        3. Simple chronic bronchitis (HCC)  Assessment & Plan:  Stable on Spiriva        4. Acute non-recurrent maxillary sinusitis  -     azithromycin (ZITHROMAX) 250 mg tablet; 2 tabs PO day 1, then 1 tab PO days 2-5    5. Elevated blood pressure reading  Comments:  may be secondary to decongestants.  To monitor at home, RTO 4-6 weeks for CPE/BP check            There are no Patient Instructions on file for this visit.    Return in about 4 weeks (around 2/26/2024) for BP check, Annual physical.    Subjective:      Patient ID: Leona Chinchilla is a 48 y.o. female.    Chief Complaint   Patient presents with   • Follow-up     Review medications and lab work results [id number]       Here today for follow up.   Has been experiencing sinus congestion, pressure, and ear discomfort.  Taking Dayquil and Sudafed  No recent COPD exacerbations.    Mood overall stable.        The following portions of the patient's history were reviewed and updated as appropriate: allergies, current medications, past family history, past medical history, past social history, past surgical history and problem list.    Review of Systems   Constitutional:  Negative for chills, fatigue and fever.   HENT:  Positive for congestion, ear pain (pressure) and sinus pressure. Negative for postnasal drip, rhinorrhea and sore throat.    Respiratory:  Negative for cough, shortness of breath and wheezing.    Cardiovascular:  Negative for chest pain.   Gastrointestinal:  Negative for abdominal pain, diarrhea, nausea and vomiting.   Musculoskeletal:  Negative for arthralgias.   Skin:  Negative for rash.   Neurological:  Negative for headaches.         Current Outpatient  "Medications   Medication Sig Dispense Refill   • albuterol (PROVENTIL HFA,VENTOLIN HFA) 90 mcg/act inhaler Inhale 2 puffs every 6 (six) hours as needed for wheezing 8 g 1   • azithromycin (ZITHROMAX) 250 mg tablet 2 tabs PO day 1, then 1 tab PO days 2-5 6 tablet 0   • Spiriva HandiHaler 18 MCG inhalation capsule inhale THE CONTENTS OF ONE CAPSULE IN THE HANDIHALER once daily 30 capsule 5   • venlafaxine (EFFEXOR-XR) 150 mg 24 hr capsule take 1 capsule by mouth every morning  WITH BREAKFAST 90 capsule 1     No current facility-administered medications for this visit.       Objective:    /92   Pulse 76   Temp (!) 97 °F (36.1 °C)   Resp 16   Ht 5' 6\" (1.676 m)   Wt 58.5 kg (129 lb)   BMI 20.82 kg/m²        Physical Exam  Vitals and nursing note reviewed.   Constitutional:       Appearance: Normal appearance. She is well-developed.   HENT:      Right Ear: Tympanic membrane normal.      Left Ear: Tympanic membrane normal.      Nose: Congestion present.      Mouth/Throat:      Pharynx: Oropharynx is clear.   Eyes:      Conjunctiva/sclera: Conjunctivae normal.   Cardiovascular:      Rate and Rhythm: Normal rate and regular rhythm.      Pulses: Normal pulses.      Heart sounds: Normal heart sounds. No murmur heard.  Pulmonary:      Effort: Pulmonary effort is normal.      Breath sounds: Normal breath sounds.   Lymphadenopathy:      Cervical: No cervical adenopathy.   Skin:     General: Skin is warm and dry.   Neurological:      Mental Status: She is alert.   Psychiatric:         Mood and Affect: Mood normal.         Behavior: Behavior normal.                YANNA Millan  "

## 2024-02-01 ENCOUNTER — TELEPHONE (OUTPATIENT)
Age: 49
End: 2024-02-01

## 2024-02-01 NOTE — TELEPHONE ENCOUNTER
Leona Ruiz     Patient was checking on her Effexor, it was called into the pharmacy 01/25/2024, patient will call the pharmacy for .

## 2024-02-22 ENCOUNTER — RA CDI HCC (OUTPATIENT)
Dept: OTHER | Facility: HOSPITAL | Age: 49
End: 2024-02-22

## 2024-02-22 NOTE — PROGRESS NOTES
HCC coding opportunities       Chart reviewed, no opportunity found: CHART REVIEWED, NO OPPORTUNITY FOUND        Patients Insurance        Commercial Insurance: [email or web address]

## 2024-02-29 ENCOUNTER — OFFICE VISIT (OUTPATIENT)
Dept: FAMILY MEDICINE CLINIC | Facility: CLINIC | Age: 49
End: 2024-02-29
Payer: COMMERCIAL

## 2024-02-29 VITALS
WEIGHT: 129.2 LBS | TEMPERATURE: 97.9 F | HEART RATE: 72 BPM | SYSTOLIC BLOOD PRESSURE: 156 MMHG | BODY MASS INDEX: 20.76 KG/M2 | RESPIRATION RATE: 18 BRPM | DIASTOLIC BLOOD PRESSURE: 88 MMHG | HEIGHT: 66 IN

## 2024-02-29 DIAGNOSIS — F17.210 CIGARETTE NICOTINE DEPENDENCE WITHOUT COMPLICATION: ICD-10-CM

## 2024-02-29 DIAGNOSIS — J41.0 SIMPLE CHRONIC BRONCHITIS (HCC): ICD-10-CM

## 2024-02-29 DIAGNOSIS — R03.0 ELEVATED BLOOD PRESSURE READING: ICD-10-CM

## 2024-02-29 DIAGNOSIS — Z12.11 SCREENING FOR COLON CANCER: ICD-10-CM

## 2024-02-29 DIAGNOSIS — Z00.00 ROUTINE ADULT HEALTH MAINTENANCE: Primary | ICD-10-CM

## 2024-02-29 DIAGNOSIS — Z23 ENCOUNTER FOR IMMUNIZATION: ICD-10-CM

## 2024-02-29 PROCEDURE — 99396 PREV VISIT EST AGE 40-64: CPT | Performed by: NURSE PRACTITIONER

## 2024-02-29 PROCEDURE — 90471 IMMUNIZATION ADMIN: CPT

## 2024-02-29 PROCEDURE — 90715 TDAP VACCINE 7 YRS/> IM: CPT

## 2024-02-29 RX ORDER — METHYLPREDNISOLONE 4 MG/1
TABLET ORAL
Qty: 1 EACH | Refills: 0 | Status: SHIPPED | OUTPATIENT
Start: 2024-02-29 | End: 2024-03-06

## 2024-02-29 NOTE — PROGRESS NOTES
FAMILY PRACTICE HEALTH MAINTENANCE OFFICE VISIT  [facility] - [facility]    NAME: Leona Chinchilla  AGE: 48 y.o. SEX: female  : 1975     DATE: 2024    Assessment and Plan     1. Routine adult health maintenance    2. Encounter for immunization  -     TDAP VACCINE GREATER THAN OR EQUAL TO 6YO IM    3. Screening for colon cancer  -     Ambulatory referral to Gastroenterology; Future    4. Simple chronic bronchitis (HCC)  -     methylPREDNISolone 4 MG tablet therapy pack; Use as directed on package    5. Cigarette nicotine dependence without complication  Assessment & Plan:  Discussed importance of smoking cessation.  She has had success with Chantix in the past.  She is not quite ready, but will let me know when she is.      6. Elevated blood pressure reading  Assessment & Plan:  She will check at home.  F./u 3 months for BP check          Patient Counseling:   Nutrition: Stressed importance of a well balanced diet, moderation of sodium/saturated fat, caloric balance and sufficient intake of fiber  Exercise: Stressed the importance of regular exercise with a goal of 150 minutes per week  Dental Health: Discussed daily flossing and brushing and regular dental visits     Immunizations reviewed: See Orders  Discussed benefits of:  Colon Cancer Screening, Mammogram , Cervical Cancer screening, and Screening labs.  BMI Counseling: Body mass index is 21.01 kg/m². Discussed with patient's BMI with her. The BMI is normal.    Return in about 3 months (around 2024) for BP check.        Chief Complaint     Chief Complaint   Patient presents with   • Physical Exam     YC       History of Present Illness     Here today for CPE.Doing well overall, still has some congestion and a cough int he morning and at night.  Already completed a Zpak.  Could not find her home BP cuff to check at home.           Well Adult Physical   Patient here for a comprehensive physical exam.      Diet and  Physical Activity  Diet: well balanced diet  Exercise: infrequently      Depression Screen  PHQ-2/9 Depression Screening    Little interest or pleasure in doing things: 0 - not at all  Feeling down, depressed, or hopeless: 0 - not at all  PHQ-2 Score: 0  PHQ-2 Interpretation: Negative depression screen          General Health  Hearing: Normal:  bilateral  Vision: goes for regular eye exams  Dental: regular dental visits and brushes teeth twice daily    Reproductive Health  Follows with gynecologist.  Periods starting to become irregular      The following portions of the patient's history were reviewed and updated as appropriate: allergies, current medications, past family history, past medical history, past social history, past surgical history and problem list.    Review of Systems     Review of Systems   Constitutional: Negative.    Respiratory: Negative.     Cardiovascular: Negative.    Gastrointestinal: Negative.    Neurological: Negative.    Psychiatric/Behavioral: Negative.         Past Medical History     Past Medical History:   Diagnosis Date   • Breast abscess    • Chronic obstructive lung disease (HCC)    • Cystic breast, unspecified laterality    • Elderly multigravida with antepartum condition or complication     last assessed: 2014   • Esophageal reflux     last assessed: 2016   • Hypothyroidism     last assessed: 2014   • Migraine     without aura, intractable  - last assessed: 2014   • Nonpuerperal abscess of breast     last assessed: Oct 16, 2014   • Subclinical hyperthyroidism     last assessed: 2014       Past Surgical History     Past Surgical History:   Procedure Laterality Date   • BREAST SURGERY  2014    Managed by: Isaias Alcocer    •  SECTION     • TONSILLECTOMY      last assessed: May 15, 2017       Social History     Social History     Socioeconomic History   • Marital status: /Civil Union     Spouse name: None   • Number of  "children: None   • Years of education: None   • Highest education level: None   Occupational History   • None   Tobacco Use   • Smoking status: Every Day     Current packs/day: 1.00     Average packs/day: 1 pack/day for 35.7 years (35.7 ttl pk-yrs)     Types: Cigarettes     Start date: 6/15/1988     Passive exposure: Current   • Smokeless tobacco: Never   • Tobacco comments:     Trying to quit   Vaping Use   • Vaping status: Never Used   Substance and Sexual Activity   • Alcohol use: No   • Drug use: No   • Sexual activity: None   Other Topics Concern   • None   Social History Narrative     noted in \"allscripts\"      Social Determinants of Health     Financial Resource Strain: Not on file   Food Insecurity: Not on file   Transportation Needs: Not on file   Physical Activity: Not on file   Stress: Not on file   Social Connections: Not on file   Intimate Partner Violence: Not on file   Housing Stability: Not on file       Family History     Family History   Problem Relation Age of Onset   • COPD Mother    • Emphysema Mother    • Pulmonary embolism Mother    • Heart disease Mother    • Aneurysm Father         of the cerebellar artery    • Thyroid disease unspecified Sister    • Down syndrome Sister         Trisomy 21   • Diabetes Maternal Grandfather         mellitus    • Bipolar disorder Daughter    • Breast cancer Maternal Aunt        Current Medications       Current Outpatient Medications:   •  albuterol (PROVENTIL HFA,VENTOLIN HFA) 90 mcg/act inhaler, Inhale 2 puffs every 6 (six) hours as needed for wheezing, Disp: 8 g, Rfl: 1  •  methylPREDNISolone 4 MG tablet therapy pack, Use as directed on package, Disp: 1 each, Rfl: 0  •  Spiriva HandiHaler 18 MCG inhalation capsule, inhale THE CONTENTS OF ONE CAPSULE IN THE HANDIHALER once daily, Disp: 30 capsule, Rfl: 5  •  venlafaxine (EFFEXOR-XR) 150 mg 24 hr capsule, take 1 capsule by mouth every morning  WITH BREAKFAST, Disp: 90 capsule, Rfl: 1     Allergies " "    Allergies   Allergen Reactions   • Penicillins    • Sulfamethoxazole-Trimethoprim        Objective     /88   Pulse 72   Temp 97.9 °F (36.6 °C) (Tympanic)   Resp 18   Ht 5' 5.75\" (1.67 m)   Wt 58.6 kg (129 lb 3.2 oz)   LMP 02/26/2024 (Exact Date)   BMI 21.01 kg/m²      Physical Exam  Vitals and nursing note reviewed.   Constitutional:       Appearance: Normal appearance. She is well-developed.   HENT:      Head: Normocephalic.      Right Ear: Tympanic membrane and external ear normal.      Left Ear: Tympanic membrane and external ear normal.      Nose: Nose normal.      Mouth/Throat:      Pharynx: Oropharynx is clear.   Eyes:      Conjunctiva/sclera: Conjunctivae normal.      Pupils: Pupils are equal, round, and reactive to light.   Neck:      Thyroid: No thyromegaly.      Vascular: No carotid bruit.   Cardiovascular:      Rate and Rhythm: Normal rate and regular rhythm.      Pulses: Normal pulses.      Heart sounds: Normal heart sounds. No murmur heard.  Pulmonary:      Effort: Pulmonary effort is normal.      Breath sounds: Normal breath sounds.   Abdominal:      General: Bowel sounds are normal. There is no distension.      Palpations: Abdomen is soft.   Musculoskeletal:         General: Normal range of motion.      Cervical back: Neck supple.   Lymphadenopathy:      Cervical: No cervical adenopathy.   Skin:     General: Skin is warm and dry.      Capillary Refill: Capillary refill takes less than 2 seconds.   Neurological:      Mental Status: She is alert and oriented to person, place, and time.      Sensory: No sensory deficit.      Coordination: Coordination normal.      Deep Tendon Reflexes: Reflexes are normal and symmetric. Reflexes normal.   Psychiatric:         Mood and Affect: Mood normal.         Behavior: Behavior normal.           Vision Screening    Right eye Left eye Both eyes   Without correction      With correction 20/25 20/20 20/20           [facility] " [facility]

## 2024-02-29 NOTE — ASSESSMENT & PLAN NOTE
Discussed importance of smoking cessation.  She has had success with Chantix in the past.  She is not quite ready, but will let me know when she is.

## 2024-03-13 ENCOUNTER — TELEPHONE (OUTPATIENT)
Age: 49
End: 2024-03-13

## 2024-03-13 ENCOUNTER — PREP FOR PROCEDURE (OUTPATIENT)
Age: 49
End: 2024-03-13

## 2024-03-13 DIAGNOSIS — Z12.11 SCREENING FOR COLON CANCER: Primary | ICD-10-CM

## 2024-03-13 NOTE — TELEPHONE ENCOUNTER
Scheduled date of colonoscopy (as of today):5/1/2024  Physician performing colonoscopy:   Location of colonoscopy: [facility]  Bowel prep reviewed with patient:Yaneth Cisneros  Instructions reviewed with patient by: Yaneth Cisneros  Clearances: N/A    Miralax Dulcolax prep sent via email

## 2024-03-13 NOTE — TELEPHONE ENCOUNTER
03/13/24  Screened by: Yaneth Cisneros    Referring Provider YANNA Ruiz    Pre- Screening:     There is no height or weight on file to calculate BMI.  Has patient been referred for a routine screening Colonoscopy? yes  Is the patient between 45-75 years old? yes      Previous Colonoscopy no   If yes:    Date: N/A    Facility: N/A    Reason: N/A        Does the patient want to see a Gastroenterologist prior to their procedure OR are they having any GI symptoms? no    Has the patient been hospitalized or had abdominal surgery in the past 6 months? no    Does the patient use supplemental oxygen? no    Does the patient take Coumadin, Lovenox, Plavix, Elliquis, Xarelto, or other blood thinning medication? no    Has the patient had a stroke, cardiac event, or stent placed in the past year? no        If patient is between 45yrs - 49yrs, please advise patient that we will have to confirm benefits & coverage with their insurance company for a routine screening colonoscopy.

## 2024-03-13 NOTE — TELEPHONE ENCOUNTER
Patient called in to follow up with provider request for  patient to monitor her BP daily at home. Results are listed:    3/2 168-97 HR=85  3/3 149/105 HR=91  3/4 138/81 HR=85  3/6 175/99 Hr=77  3/7 172/99 HR=85  3/9 143/86 HR=91  3/11 165/100 HR=96  3/13 136/102 [id number]    Please follow up with patient for provider response; possible earlier OV than 5/29 fu appointment.

## 2024-03-14 NOTE — TELEPHONE ENCOUNTER
Nir for pt.  Reviewed her readings, if she calls back, please let her know that I would like to start her on medication since her readings have been really high.  Would start on Losartan 50mg, taken once daily, and will have her keep her follow up visit in 3 months. YANNA Millan

## 2024-03-19 ENCOUNTER — TELEPHONE (OUTPATIENT)
Age: 49
End: 2024-03-19

## 2024-03-19 DIAGNOSIS — F17.210 CIGARETTE NICOTINE DEPENDENCE WITHOUT COMPLICATION: ICD-10-CM

## 2024-03-19 DIAGNOSIS — I10 ESSENTIAL HYPERTENSION: Primary | ICD-10-CM

## 2024-03-19 RX ORDER — LOSARTAN POTASSIUM 50 MG/1
50 TABLET ORAL DAILY
Qty: 30 TABLET | Refills: 2 | Status: SHIPPED | OUTPATIENT
Start: 2024-03-19

## 2024-03-19 NOTE — TELEPHONE ENCOUNTER
This is now a duplicate encounter regarding the same issue.    Please let her know that I would like to start her on medication since her readings have been really high.  Would start on Losartan 50mg, taken once daily, and will have her keep her follow up visit in 3 months. If agreeable, will send rx for her.  YANNA Millan

## 2024-03-19 NOTE — TELEPHONE ENCOUNTER
Patient called requesting a call back from Dolly regarding her blood pressure readings. Patient stated she never heard back last week about her readings she gave. Patient called stating her last reading this morning was 161/105 and she stated she had a nose bleed before checking it. Please call patient back to discuss.

## 2024-03-19 NOTE — TELEPHONE ENCOUNTER
I spoke to the patient and informed her, she is wondering if the high blood pressure is why she is getting the nose bleeds? She had one on Saturday and today.     Ese Whittington LPN

## 2024-03-19 NOTE — TELEPHONE ENCOUNTER
She can start it as soon as she picks it up, taken once daily, usually in the AM, but really whenever is easiest for her to be consistent.  She can periodically check her BP at home, but doesn't need to check it every day.  Will keep her follow up in May the same, but please ask her to follow up sooner if she has any problems or concerns. YANNA Millan

## 2024-03-20 NOTE — TELEPHONE ENCOUNTER
I spoke to the patient and informed her. She also stated that she would like to start the Chantix. She stated she does not need a call back about the Chantix unless we need to talk to her about something.     Ese Whittington LPN

## 2024-03-21 RX ORDER — VARENICLINE TARTRATE 1 MG/1
1 TABLET, FILM COATED ORAL 2 TIMES DAILY
Qty: 60 TABLET | Refills: 1 | Status: SHIPPED | OUTPATIENT
Start: 2024-03-21

## 2024-04-16 ENCOUNTER — ANESTHESIA (OUTPATIENT)
Dept: ANESTHESIOLOGY | Facility: HOSPITAL | Age: 49
End: 2024-04-16

## 2024-04-16 ENCOUNTER — ANESTHESIA EVENT (OUTPATIENT)
Dept: ANESTHESIOLOGY | Facility: HOSPITAL | Age: 49
End: 2024-04-16

## 2024-05-01 ENCOUNTER — ANESTHESIA EVENT (OUTPATIENT)
Dept: GASTROENTEROLOGY | Facility: AMBULARY SURGERY CENTER | Age: 49
End: 2024-05-01

## 2024-05-01 ENCOUNTER — HOSPITAL ENCOUNTER (OUTPATIENT)
Dept: GASTROENTEROLOGY | Facility: AMBULARY SURGERY CENTER | Age: 49
Setting detail: OUTPATIENT SURGERY
Discharge: HOME | End: 2024-05-01
Attending: INTERNAL MEDICINE
Payer: COMMERCIAL

## 2024-05-01 ENCOUNTER — ANESTHESIA (OUTPATIENT)
Dept: GASTROENTEROLOGY | Facility: AMBULARY SURGERY CENTER | Age: 49
End: 2024-05-01

## 2024-05-01 VITALS
SYSTOLIC BLOOD PRESSURE: 135 MMHG | DIASTOLIC BLOOD PRESSURE: 83 MMHG | HEART RATE: 76 BPM | OXYGEN SATURATION: 96 % | TEMPERATURE: 98.3 F | RESPIRATION RATE: 20 BRPM

## 2024-05-01 DIAGNOSIS — Z12.11 SCREENING FOR COLON CANCER: ICD-10-CM

## 2024-05-01 PROBLEM — K21.9 ESOPHAGEAL REFLUX: Status: ACTIVE | Noted: 2024-05-01

## 2024-05-01 PROBLEM — E03.9 HYPOTHYROIDISM: Status: ACTIVE | Noted: 2024-05-01

## 2024-05-01 PROCEDURE — 45385 COLONOSCOPY W/LESION REMOVAL: CPT | Performed by: INTERNAL MEDICINE

## 2024-05-01 PROCEDURE — 88305 TISSUE EXAM BY PATHOLOGIST: CPT | Performed by: PATHOLOGY

## 2024-05-01 RX ORDER — PROPOFOL 10 MG/ML
INJECTION, EMULSION INTRAVENOUS AS NEEDED
Status: DISCONTINUED | OUTPATIENT
Start: 2024-05-01 | End: 2024-05-01

## 2024-05-01 RX ORDER — SODIUM CHLORIDE 9 MG/ML
INJECTION, SOLUTION INTRAVENOUS CONTINUOUS PRN
Status: DISCONTINUED | OUTPATIENT
Start: 2024-05-01 | End: 2024-05-01

## 2024-05-01 RX ORDER — PROPOFOL 10 MG/ML
INJECTION, EMULSION INTRAVENOUS CONTINUOUS PRN
Status: DISCONTINUED | OUTPATIENT
Start: 2024-05-01 | End: 2024-05-01

## 2024-05-01 RX ORDER — LIDOCAINE HYDROCHLORIDE 10 MG/ML
INJECTION, SOLUTION EPIDURAL; INFILTRATION; INTRACAUDAL; PERINEURAL AS NEEDED
Status: DISCONTINUED | OUTPATIENT
Start: 2024-05-01 | End: 2024-05-01

## 2024-05-01 RX ORDER — SODIUM CHLORIDE, SODIUM LACTATE, POTASSIUM CHLORIDE, CALCIUM CHLORIDE 600; 310; 30; 20 MG/100ML; MG/100ML; MG/100ML; MG/100ML
125 INJECTION, SOLUTION INTRAVENOUS CONTINUOUS
Status: CANCELLED | OUTPATIENT
Start: 2024-05-01

## 2024-05-01 RX ADMIN — LIDOCAINE HYDROCHLORIDE 50 MG: 10 INJECTION, SOLUTION EPIDURAL; INFILTRATION; INTRACAUDAL; PERINEURAL at 14:33

## 2024-05-01 RX ADMIN — SODIUM CHLORIDE: 0.9 INJECTION, SOLUTION INTRAVENOUS at 14:31

## 2024-05-01 RX ADMIN — PROPOFOL 100 MG: 10 INJECTION, EMULSION INTRAVENOUS at 14:33

## 2024-05-01 RX ADMIN — PROPOFOL 140 MCG/KG/MIN: 10 INJECTION, EMULSION INTRAVENOUS at 14:35

## 2024-05-01 NOTE — ANESTHESIA PREPROCEDURE EVALUATION
Procedure:  COLONOSCOPY    Relevant Problems   ENDO   (+) Hypothyroidism      GI/HEPATIC   (+) Esophageal reflux      PULMONARY   (+) Chronic obstructive pulmonary disease (HCC)      Behavioral Health   (+) Bipolar 2 disorder (HCC)      Other   (+) Elevated blood pressure reading             Anesthesia Plan  ASA Score- 2     Anesthesia Type- IV sedation with anesthesia with ASA Monitors.         Additional Monitors:     Airway Plan:            Plan Factors-    Chart reviewed.                      Induction- intravenous.    Postoperative Plan-     Informed Consent- Anesthetic plan and risks discussed with patient.  I personally reviewed this patient with the CRNA. Discussed and agreed on the Anesthesia Plan with the CRNA..

## 2024-05-01 NOTE — ANESTHESIA POSTPROCEDURE EVALUATION
Post-Op Assessment Note    CV Status:  Stable  Pain Score: 0    Pain management: adequate       Mental Status:  Sleepy   Hydration Status:  Stable   PONV Controlled:  None   Airway Patency:  Patent     Post Op Vitals Reviewed: Yes    No anethesia notable event occurred.    Staff: Anesthesiologist, CRNA               /83 (05/01/24 1501)    Temp      Pulse 82 (05/01/24 1501)   Resp 20 (05/01/24 1501)    SpO2 100 % (05/01/24 1501)

## 2024-05-01 NOTE — H&P
History and Physical - [facility]  Leona Chinchilla 48 y.o. female MRN: [id number]    HPI: Leona Chinchilla is a 48 y.o. year old female who presents for colon cancer screening.      Review of Systems    Historical Information   Past Medical History:   Diagnosis Date    Breast abscess     Chronic obstructive lung disease (HCC)     Cystic breast, unspecified laterality     Elderly multigravida with antepartum condition or complication     last assessed: 2014    Esophageal reflux 2024    last assessed: 2016    Hypothyroidism 2024    last assessed: 2014    Migraine     without aura, intractable  - last assessed: 2014    Nonpuerperal abscess of breast     last assessed: Oct 16, 2014    Subclinical hyperthyroidism     last assessed: 2014     Past Surgical History:   Procedure Laterality Date    BREAST SURGERY  2014    Managed by: Isaias Alcocer      SECTION      TONSILLECTOMY      last assessed: May 15, 2017     Social History   Social History     Substance and Sexual Activity   Alcohol Use No     Social History     Substance and Sexual Activity   Drug Use No     Social History     Tobacco Use   Smoking Status Every Day    Current packs/day: 1.00    Average packs/day: 1 pack/day for 35.9 years (35.9 ttl pk-yrs)    Types: Cigarettes    Start date: 6/15/1988    Passive exposure: Current   Smokeless Tobacco Never   Tobacco Comments    Trying to quit     Family History   Problem Relation Age of Onset    COPD Mother     Emphysema Mother     Pulmonary embolism Mother     Heart disease Mother     Aneurysm Father         of the cerebellar artery     Thyroid disease unspecified Sister     Down syndrome Sister         Trisomy 21    Diabetes Maternal Grandfather         mellitus     Bipolar disorder Daughter     Breast cancer Maternal Aunt        Meds/Allergies     (Not in a hospital admission)      Allergies   Allergen Reactions    Penicillins Swelling     Sulfamethoxazole-Trimethoprim Swelling       Objective     LMP 04/28/2024       PHYSICAL EXAM    Gen: NAD  CV: RRR  CHEST: Clear  ABD: soft, NT/ND  EXT: no edema  Neuro: AAO      ASSESSMENT/PLAN:  This is a 48 y.o. year old female here for colon cancer screening.  Patient was explained about the risks and benefits of the procedure. Risks including but not limited to bleeding, infection, perforation were explained in detail.       PLAN:   Procedure: Colonoscopy.

## 2024-05-06 PROCEDURE — 88305 TISSUE EXAM BY PATHOLOGIST: CPT | Performed by: PATHOLOGY

## 2024-05-07 ENCOUNTER — TELEPHONE (OUTPATIENT)
Dept: GASTROENTEROLOGY | Facility: AMBULARY SURGERY CENTER | Age: 49
End: 2024-05-07

## 2024-05-07 NOTE — TELEPHONE ENCOUNTER
----- Message from Kavin Wright MD sent at 5/7/2024  8:25 AM EDT -----  Repeat colonoscopy in 3 years.  Avoid fibrous materials 5 days prior to the repeat colonoscopy.

## 2024-05-07 NOTE — TELEPHONE ENCOUNTER
Called and left a voicemail for the patient regarding colonoscopy results and recommendations. Advised to return call to office.

## 2024-05-09 ENCOUNTER — TELEPHONE (OUTPATIENT)
Dept: GASTROENTEROLOGY | Facility: AMBULARY SURGERY CENTER | Age: 49
End: 2024-05-09

## 2024-05-09 NOTE — TELEPHONE ENCOUNTER
Called and left a voicemail for the patient regarding colonoscopy results & recommendations. Advised to return call to office to discuss.     3 year colon recall set with recs.     Letter sent.

## 2024-05-13 DIAGNOSIS — I10 ESSENTIAL HYPERTENSION: ICD-10-CM

## 2024-05-13 DIAGNOSIS — F17.210 CIGARETTE NICOTINE DEPENDENCE WITHOUT COMPLICATION: ICD-10-CM

## 2024-05-13 RX ORDER — VARENICLINE TARTRATE 1 MG/1
1 TABLET, FILM COATED ORAL 2 TIMES DAILY
Qty: 60 TABLET | Refills: 1 | Status: SHIPPED | OUTPATIENT
Start: 2024-05-13

## 2024-05-13 RX ORDER — LOSARTAN POTASSIUM 50 MG/1
50 TABLET ORAL DAILY
Qty: 30 TABLET | Refills: 2 | Status: SHIPPED | OUTPATIENT
Start: 2024-05-13

## 2024-05-13 NOTE — TELEPHONE ENCOUNTER
Patient requesting refills of Cozaar and Chantix. Patient's PCP is Dolly Ruiz, routing to clinical since Dolly is out of office

## 2024-06-18 DIAGNOSIS — J44.9 CHRONIC OBSTRUCTIVE PULMONARY DISEASE, UNSPECIFIED COPD TYPE (HCC): ICD-10-CM

## 2024-06-18 RX ORDER — TIOTROPIUM BROMIDE 18 UG/1
18 CAPSULE ORAL; RESPIRATORY (INHALATION) DAILY
Qty: 30 CAPSULE | Refills: 5 | Status: SHIPPED | OUTPATIENT
Start: 2024-06-18

## 2024-07-19 DIAGNOSIS — F31.81 BIPOLAR 2 DISORDER (HCC): ICD-10-CM

## 2024-07-19 RX ORDER — VENLAFAXINE HYDROCHLORIDE 150 MG/1
150 CAPSULE, EXTENDED RELEASE ORAL
Qty: 100 CAPSULE | Refills: 1 | Status: SHIPPED | OUTPATIENT
Start: 2024-07-19

## 2024-08-08 DIAGNOSIS — I10 ESSENTIAL HYPERTENSION: ICD-10-CM

## 2024-08-08 RX ORDER — LOSARTAN POTASSIUM 50 MG/1
50 TABLET ORAL DAILY
Qty: 100 TABLET | Refills: 1 | Status: SHIPPED | OUTPATIENT
Start: 2024-08-08

## 2025-02-05 DIAGNOSIS — J44.9 CHRONIC OBSTRUCTIVE PULMONARY DISEASE, UNSPECIFIED COPD TYPE (HCC): ICD-10-CM

## 2025-02-06 RX ORDER — TIOTROPIUM BROMIDE 18 UG/1
18 CAPSULE ORAL; RESPIRATORY (INHALATION) DAILY
Qty: 30 CAPSULE | Refills: 5 | Status: SHIPPED | OUTPATIENT
Start: 2025-02-06